# Patient Record
Sex: MALE | Race: WHITE | Employment: OTHER | URBAN - NONMETROPOLITAN AREA
[De-identification: names, ages, dates, MRNs, and addresses within clinical notes are randomized per-mention and may not be internally consistent; named-entity substitution may affect disease eponyms.]

---

## 2023-10-13 ENCOUNTER — HOSPITAL ENCOUNTER (OUTPATIENT)
Dept: GENERAL RADIOLOGY | Age: 68
Discharge: HOME | End: 2023-10-13
Payer: MEDICARE

## 2023-10-13 ENCOUNTER — HOSPITAL ENCOUNTER (OUTPATIENT)
Age: 68
Discharge: HOME | End: 2023-10-13
Payer: MEDICARE

## 2023-10-13 DIAGNOSIS — M54.2 CERVICALGIA: ICD-10-CM

## 2023-10-13 DIAGNOSIS — M54.50 LOW BACK PAIN, UNSPECIFIED BACK PAIN LATERALITY, UNSPECIFIED CHRONICITY, UNSPECIFIED WHETHER SCIATICA PRESENT: ICD-10-CM

## 2023-10-13 PROCEDURE — 72040 X-RAY EXAM NECK SPINE 2-3 VW: CPT

## 2023-10-13 PROCEDURE — 72100 X-RAY EXAM L-S SPINE 2/3 VWS: CPT

## 2023-11-28 ENCOUNTER — OFFICE VISIT (OUTPATIENT)
Dept: PHYSICAL MEDICINE AND REHAB | Age: 68
End: 2023-11-28
Payer: MEDICARE

## 2023-11-28 ENCOUNTER — TELEPHONE (OUTPATIENT)
Dept: PHYSICAL MEDICINE AND REHAB | Age: 68
End: 2023-11-28

## 2023-11-28 VITALS — WEIGHT: 241.2 LBS | DIASTOLIC BLOOD PRESSURE: 82 MMHG | SYSTOLIC BLOOD PRESSURE: 124 MMHG

## 2023-11-28 DIAGNOSIS — M51.36 LUMBAR DEGENERATIVE DISC DISEASE: ICD-10-CM

## 2023-11-28 DIAGNOSIS — M54.17 LUMBOSACRAL RADICULITIS: Primary | ICD-10-CM

## 2023-11-28 DIAGNOSIS — M47.816 LUMBAR SPONDYLOSIS: ICD-10-CM

## 2023-11-28 DIAGNOSIS — M54.41 CHRONIC MIDLINE LOW BACK PAIN WITH BILATERAL SCIATICA: ICD-10-CM

## 2023-11-28 DIAGNOSIS — M54.42 CHRONIC MIDLINE LOW BACK PAIN WITH BILATERAL SCIATICA: ICD-10-CM

## 2023-11-28 DIAGNOSIS — G89.29 CHRONIC MYOFASCIAL PAIN: ICD-10-CM

## 2023-11-28 DIAGNOSIS — G89.29 CHRONIC MIDLINE LOW BACK PAIN WITH BILATERAL SCIATICA: ICD-10-CM

## 2023-11-28 DIAGNOSIS — Z98.890 HISTORY OF LUMBAR LAMINECTOMY: ICD-10-CM

## 2023-11-28 DIAGNOSIS — M79.18 CHRONIC MYOFASCIAL PAIN: ICD-10-CM

## 2023-11-28 PROCEDURE — G8427 DOCREV CUR MEDS BY ELIG CLIN: HCPCS | Performed by: NURSE PRACTITIONER

## 2023-11-28 PROCEDURE — 3017F COLORECTAL CA SCREEN DOC REV: CPT | Performed by: NURSE PRACTITIONER

## 2023-11-28 PROCEDURE — 1123F ACP DISCUSS/DSCN MKR DOCD: CPT | Performed by: NURSE PRACTITIONER

## 2023-11-28 PROCEDURE — G8421 BMI NOT CALCULATED: HCPCS | Performed by: NURSE PRACTITIONER

## 2023-11-28 PROCEDURE — 99205 OFFICE O/P NEW HI 60 MIN: CPT | Performed by: NURSE PRACTITIONER

## 2023-11-28 PROCEDURE — 4004F PT TOBACCO SCREEN RCVD TLK: CPT | Performed by: NURSE PRACTITIONER

## 2023-11-28 PROCEDURE — G8484 FLU IMMUNIZE NO ADMIN: HCPCS | Performed by: NURSE PRACTITIONER

## 2023-11-28 RX ORDER — SOTALOL HYDROCHLORIDE 80 MG/1
TABLET ORAL
COMMUNITY
Start: 2023-09-15

## 2023-11-28 RX ORDER — ASPIRIN 81 MG/1
81 TABLET ORAL DAILY
COMMUNITY

## 2023-11-28 RX ORDER — GABAPENTIN 100 MG/1
CAPSULE ORAL
COMMUNITY
Start: 2023-11-10

## 2023-11-28 RX ORDER — HYDROCODONE BITARTRATE AND ACETAMINOPHEN 7.5; 325 MG/1; MG/1
TABLET ORAL
COMMUNITY
Start: 2023-08-29

## 2023-11-28 RX ORDER — BACLOFEN 20 MG/1
20 TABLET ORAL 3 TIMES DAILY PRN
COMMUNITY
Start: 2023-11-07

## 2023-11-28 RX ORDER — SUCRALFATE 1 G/1
TABLET ORAL
COMMUNITY
Start: 2023-11-10

## 2023-11-28 RX ORDER — MIDODRINE HYDROCHLORIDE 10 MG/1
TABLET ORAL
COMMUNITY
Start: 2023-11-10

## 2023-11-28 RX ORDER — TIMOLOL MALEATE 5 MG/ML
SOLUTION/ DROPS OPHTHALMIC
COMMUNITY
Start: 2023-11-13

## 2023-11-28 RX ORDER — MULTIVIT-MIN/FA/LYCOPEN/LUTEIN .4-300-25
1 TABLET ORAL DAILY
COMMUNITY

## 2023-11-28 RX ORDER — LATANOPROST 50 UG/ML
SOLUTION/ DROPS OPHTHALMIC
COMMUNITY
Start: 2023-10-20

## 2023-11-28 RX ORDER — PANTOPRAZOLE SODIUM 40 MG/1
TABLET, DELAYED RELEASE ORAL
COMMUNITY
Start: 2023-09-15

## 2023-11-28 ASSESSMENT — ENCOUNTER SYMPTOMS
CHOKING: 0
BACK PAIN: 1
CHEST TIGHTNESS: 0
WHEEZING: 0
SHORTNESS OF BREATH: 1
GASTROINTESTINAL NEGATIVE: 1
APNEA: 0

## 2023-11-28 NOTE — PROGRESS NOTES
[address]  [facility]  Dept: [phone]  Dept Fax: [phone]: [phone]    Visit Date: 11/28/2023    Crispin Hatch is a 70 y.o. male who is referred for pain management evaluation and treatment per Dr. Yuliana Martines. CAGE and CAGE-AID Questions   1. In the last three months, have you felt you should cut down or stop drinking or using drugs? Yes []        No [x]     2. In the last three months, has anyone annoyed you or gotten on your nerves by telling you to cut down or stop drinking or using drugs? Yes []        No [x]     3. In the last three months, have you felt guilty or bad about how much you drink or use drugs? Yes []        No [x]     4. In the last three months, have you been waking up wanting to have an alcoholic drink or use drugs? Yes []        No [x]        Opioid Risk Tool:  Clinician Form       1. Family History of Substance Abuse: Female Male    Alcohol   []1   []3    Illegal drugs   []2   []3    Prescription drugs     []4   []4   2. Personal History of Substance Abuse:          Alcohol   []3   []3    Illegal drugs   []4   []4    Prescription drugs     []5   []5   3. Age (alban box if between 12 and 39):     []1   []1   4. History of Preadolescent Sexual Abuse:     []3   []0   5. Psychological Disease:      Attention deficit disorder, obsessive-compulsive disorder, bipolar, schizophrenia   []2   []2      Depression     []1   []1    Scoring Totals       Total Score  Low Risk  Moderate Risk  High Risk   Risk Category   0 - 3   4 - 7   8 or Above      Patient states symptoms interfere with:  A.  General Activity:  yes   B. Mood: yes    C. Walking Ability:   yes   D. Normal Work (Includes both work outside the home and housework):   yes    E.  Relations with Other People:  yes   F. Sleep:   yes   G.  Enjoyment of Life:  yes
is normal.      Breath sounds: Normal breath sounds. Abdominal:      General: Abdomen is flat. Palpations: Abdomen is soft. Musculoskeletal:         General: Tenderness present. Cervical back: Normal range of motion. Rigidity present. No tenderness. Thoracic back: Tenderness present. No bony tenderness. Normal range of motion. Lumbar back: Tenderness and bony tenderness present. No swelling or edema. Decreased range of motion. Positive right straight leg raise test and positive left straight leg raise test.        Back:    Skin:         Neurological:      Mental Status: He is alert. Sensory: Sensory deficit present. Motor: Weakness present. Coordination: Coordination abnormal.      Gait: Gait abnormal.      Comments: 4/5 strength lower extremities          CRISTINO  Patricks test  positive  Yeoman's or Gaenslen's  positive     Assessment:     1. Lumbosacral radiculitis    2. Lumbar spondylosis    3. Lumbar degenerative disc disease    4. Chronic myofascial pain    5. Chronic midline low back pain with bilateral sciatica    6. History of lumbar laminectomy            Plan:      Patient read and signed orientation and opioid agreement. OARRS reviewed. Current MED: 0  Patient was not offered naloxone for home. Discussed long term side effects of medications, tolerance, dependency and addiction. UDS preformed today. Patient told can not receive any pain medications from any other source. No evidence of abuse, diversion or aberrant behavior. Medications and/or procedures to improve function and quality of life- patient understanding with this and that may not be pain free  Discussed possible weaning of medication dosing dependent on treatment/procedure results.    Discussed with patient about safe storage of medications at home  Reviewed pcp notes in detail   Reviewed lumbar and cervical xray in detail   Ordered Lumbar MRI with and without contrast  d/t radicular pain (patient

## 2023-11-28 NOTE — TELEPHONE ENCOUNTER
Pt.called to say he called [employer] about his pacemaker and they say he can have the MRI. He wants to know if will order instead of the ct scan?

## 2023-11-29 NOTE — TELEPHONE ENCOUNTER
Called pt.and Reviewed Zaid REILLY response with wife(lito). OK with going to [facility]. Called scheduling and they will call pt. To schedule as will need verification from pt. s heart  For his pacemaker that is compatable.

## 2023-11-29 NOTE — TELEPHONE ENCOUNTER
Yes, I made an addendum to my note from yesterday and canceled the CT order and ordered a lumbar MRI

## 2024-01-09 ENCOUNTER — HOSPITAL ENCOUNTER (OUTPATIENT)
Dept: MRI IMAGING | Age: 69
Discharge: HOME | End: 2024-01-09
Payer: MEDICARE

## 2024-01-09 DIAGNOSIS — M54.17 LUMBOSACRAL RADICULITIS: ICD-10-CM

## 2024-01-09 DIAGNOSIS — M51.36 LUMBAR DEGENERATIVE DISC DISEASE: ICD-10-CM

## 2024-01-09 DIAGNOSIS — M54.42 CHRONIC MIDLINE LOW BACK PAIN WITH BILATERAL SCIATICA: ICD-10-CM

## 2024-01-09 DIAGNOSIS — Z98.890 HISTORY OF LUMBAR LAMINECTOMY: ICD-10-CM

## 2024-01-09 DIAGNOSIS — G89.29 CHRONIC MIDLINE LOW BACK PAIN WITH BILATERAL SCIATICA: ICD-10-CM

## 2024-01-09 DIAGNOSIS — M54.41 CHRONIC MIDLINE LOW BACK PAIN WITH BILATERAL SCIATICA: ICD-10-CM

## 2024-01-09 DIAGNOSIS — M47.816 LUMBAR SPONDYLOSIS: ICD-10-CM

## 2024-01-09 LAB — POC CREATININE WHOLE BLOOD: 1 MG/DL (ref 0.5–1.2)

## 2024-01-09 PROCEDURE — 82565 ASSAY OF CREATININE: CPT

## 2024-01-09 PROCEDURE — 6360000004 HC RX CONTRAST MEDICATION: Performed by: NURSE PRACTITIONER

## 2024-01-09 PROCEDURE — 72158 MRI LUMBAR SPINE W/O & W/DYE: CPT

## 2024-01-09 PROCEDURE — A9579 GAD-BASE MR CONTRAST NOS,1ML: HCPCS | Performed by: NURSE PRACTITIONER

## 2024-01-09 RX ADMIN — GADOTERIDOL 20 ML: 279.3 INJECTION, SOLUTION INTRAVENOUS at 15:57

## 2024-01-16 ENCOUNTER — TELEPHONE (OUTPATIENT)
Dept: PHYSICAL MEDICINE AND REHAB | Age: 69
End: 2024-01-16

## 2024-01-16 DIAGNOSIS — G89.29 CHRONIC MYOFASCIAL PAIN: ICD-10-CM

## 2024-01-16 DIAGNOSIS — Z98.890 HISTORY OF LUMBAR LAMINECTOMY: ICD-10-CM

## 2024-01-16 DIAGNOSIS — M54.42 CHRONIC MIDLINE LOW BACK PAIN WITH BILATERAL SCIATICA: ICD-10-CM

## 2024-01-16 DIAGNOSIS — M54.17 LUMBOSACRAL RADICULITIS: Primary | ICD-10-CM

## 2024-01-16 DIAGNOSIS — M79.18 CHRONIC MYOFASCIAL PAIN: ICD-10-CM

## 2024-01-16 DIAGNOSIS — M51.36 LUMBAR DEGENERATIVE DISC DISEASE: ICD-10-CM

## 2024-01-16 DIAGNOSIS — M47.816 LUMBAR SPONDYLOSIS: ICD-10-CM

## 2024-01-16 DIAGNOSIS — G89.29 CHRONIC MIDLINE LOW BACK PAIN WITH BILATERAL SCIATICA: ICD-10-CM

## 2024-01-16 DIAGNOSIS — M54.41 CHRONIC MIDLINE LOW BACK PAIN WITH BILATERAL SCIATICA: ICD-10-CM

## 2024-01-16 NOTE — TELEPHONE ENCOUNTER
Results of Lumbar MRI:   IMPRESSION:     1. Significant bilateral foraminal stenosis throughout the lumbar spine. This is severe on the left at the L5-S1 level.  2. Spinal canal stenosis is mild to moderate at the L1-2 level.    This can be discussed further at FU and treatment such as LESI vs TFLESI.     I will sign therapy order and he should start this and follow up after

## 2024-01-16 NOTE — TELEPHONE ENCOUNTER
Notif wife(HIPAA) of results of MRI as per Zaid RICO And therapy ordered. Reminded to call closer to when therapy done for an aptm for further review of next options with Zaid.

## 2024-01-16 NOTE — TELEPHONE ENCOUNTER
Wife(HIPAA) called and requested results on MRI. She also inquired about him needing aqua therapy and told her it was ordered when you last saw on  to be done at the [facility]. He did not do as they were waiting on the MRI to get done. Will need new order since . Setup for you to resign.

## 2024-01-18 NOTE — TELEPHONE ENCOUNTER
Wife called in and LVM saying Gary hurt his right arm and pulled a muscle. He is going to hold off till next week to start therapy.

## 2024-02-16 ENCOUNTER — HOSPITAL ENCOUNTER (OUTPATIENT)
Dept: PHYSICAL THERAPY | Age: 69
Setting detail: THERAPIES SERIES
Discharge: HOME | End: 2024-02-16
Payer: MEDICARE

## 2024-02-16 PROCEDURE — 97530 THERAPEUTIC ACTIVITIES: CPT

## 2024-02-16 PROCEDURE — 97162 PT EVAL MOD COMPLEX 30 MIN: CPT
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
## 2024-02-20 ENCOUNTER — HOSPITAL ENCOUNTER (OUTPATIENT)
Dept: PHYSICAL THERAPY | Age: 69
Setting detail: THERAPIES SERIES
Discharge: HOME | End: 2024-02-20
Payer: MEDICARE

## 2024-02-20 PROCEDURE — 97113 AQUATIC THERAPY/EXERCISES: CPT

## 2024-02-20 NOTE — PROGRESS NOTES
Patient limited by fatigue  []  Patient limited by pain   []  Patient limited by medical complications  []  Other:     Assessment: Initiated aquatic therex. Tolerated without increased symptoms.         Body Structures/Functions/Activity Limitations: edema, impaired activity tolerance, impaired balance, impaired endurance, impaired muscle tone, impaired ROM, impaired strength, pain, abnormal gait, and abnormal posture  Prognosis: good    GOALS:  Patient Goal: Better manage pain; keep from losing strength and mobility    Short Term Goals:  Time Frame: 4 weeks  Caleb will be able to perform 10+ reps of all exercises in the pool while maintaining neutral spine posturing without requiring verbal cuing.  Caleb's left LE strength will improve to 4/5 with hip flexor, quads and hamstrings allowing greater confidence with ascending/descending stairs.  Caleb's Modified ALYSA will improve to <13 indicating moderate disability related to his back pain.  Caleb will transition from pool program to land based program to establish a functional exercises program.      Long Term Goals:  Time Frame: 8 weeks  Caleb will be discharged from PT with independent HEP to be performed in both the pool and land to maintain all gains achieved in clinic.  Caleb' Modified ALYSA will improve to <5 indicating minimal to no disability related to his back pain.      Patient Education:   [x]  HEP/Education Completed: Plan of Care, Goals, aquatic therapy  [facility] Access Code:  []  No new Education completed  []  Reviewed Prior HEP      [x]  Patient verbalized and/or demonstrated understanding of education provided.  []  Patient unable to verbalize and/or demonstrate understanding of education provided.  Will continue education.  [x]  Barriers to learning: n/a    PLAN:  Treatment Recommendations: Strengthening, Range of Motion, Balance Training, Gait Training, Stair Training, Neuromuscular Re-education, Manual Therapy - Soft Tissue Mobilization, Manual Therapy -

## 2024-02-23 ENCOUNTER — HOSPITAL ENCOUNTER (OUTPATIENT)
Dept: PHYSICAL THERAPY | Age: 69
Setting detail: THERAPIES SERIES
Discharge: HOME | End: 2024-02-23
Payer: MEDICARE

## 2024-02-23 PROCEDURE — 97113 AQUATIC THERAPY/EXERCISES: CPT
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
## 2024-02-27 ENCOUNTER — HOSPITAL ENCOUNTER (OUTPATIENT)
Dept: PHYSICAL THERAPY | Age: 69
Setting detail: THERAPIES SERIES
Discharge: HOME | End: 2024-02-27
Payer: MEDICARE

## 2024-02-27 PROCEDURE — 97113 AQUATIC THERAPY/EXERCISES: CPT
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
## 2024-03-01 ENCOUNTER — HOSPITAL ENCOUNTER (OUTPATIENT)
Dept: PHYSICAL THERAPY | Age: 69
Setting detail: THERAPIES SERIES
Discharge: HOME | End: 2024-03-01
Payer: MEDICARE

## 2024-03-01 PROCEDURE — 97113 AQUATIC THERAPY/EXERCISES: CPT
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
## 2024-03-05 ENCOUNTER — HOSPITAL ENCOUNTER (OUTPATIENT)
Dept: PHYSICAL THERAPY | Age: 69
Setting detail: THERAPIES SERIES
Discharge: HOME | End: 2024-03-05
Payer: MEDICARE

## 2024-03-05 PROCEDURE — 97113 AQUATIC THERAPY/EXERCISES: CPT

## 2024-03-05 NOTE — PROGRESS NOTES
He has noticed that he has lost strength and muscle girth to bilateral gastrocs. He has addressed it by working out at home, walking on uneven surfaces etc. He has had [employer] therapy in the past to address low back pain.    His primary pain clinic doctor is referring him to therapy to try aquatic therapy before he is able to fill his hydrocodone prescription. He has had chronic pain treatment in the past which involved injections, medication, nerve stimulation etc.     He has been told by his cardiologist that he is no longer allowed to take NSAIDs due possible interference with cardiac meds; he is allowed ice for pain management at this time.     He denies neuropathy, however he states that he nerves have  off. He struggles with pushing/pulling through the legs    He is currently seeing Abby at [facility] for his right shoulder.     SUBJECTIVE: Subjective reports of continued pain in the Low Back and Bilateral upper extremities, primarily shoulders. Overall Pain rating, 8/10 upon initial patient interview. Patient reports occasional NSAID usage, in moderation and in small dosage, to manage pain when necessary.      AQUATICS TREATMENT   Precautions:    Pain:7/10 generalized    “X” in shaded column indicates activity completed today   Exercise/Intervention Sets/Sec  Notes   Walk Forward X2 laps  X    Walk Backward X2 laps  X    Walk Sideways X2 laps  X           Lower Extremity Exercises:       Heel/Toe Raises x20  X Performed without holding onto the side due to increase in right thumb pain with gripping   Marches 4 lengths  X    Squats x20  X    3 Way Hip x20  X    Hamstring Curls X4 lengths   X    Lunges       Step-Ups              Lower Extremity Stretches:              Seated Exercises:              Upper Extremity Exercises:       Shoulder Flexion x20  x Performed with back up against side of pool to stabilize his back and to limit how far he moves his arms    Shoulder ABD/ADD x20  x    Shoulder Horizontal

## 2024-03-08 ENCOUNTER — HOSPITAL ENCOUNTER (OUTPATIENT)
Dept: PHYSICAL THERAPY | Age: 69
Setting detail: THERAPIES SERIES
Discharge: HOME | End: 2024-03-08
Payer: MEDICARE

## 2024-03-08 PROCEDURE — 97113 AQUATIC THERAPY/EXERCISES: CPT

## 2024-03-08 NOTE — PROGRESS NOTES
Hip ABD/ADD       Hip Flex/Ext            Specific Interventions Next Treatment: Initiate aquatic therapy program    Activity/Treatment Tolerance:  []  Patient tolerated treatment well  []  Patient limited by fatigue  []  Patient limited by pain   []  Patient limited by medical complications  []  Other:     Assessment: Caleb requires constant cuing/reminding to keep movements smaller and to control the movements so as not to aggravate his back pain. Tolerates aquatic therex well.      Body Structures/Functions/Activity Limitations: edema, impaired activity tolerance, impaired balance, impaired endurance, impaired muscle tone, impaired ROM, impaired strength, pain, abnormal gait, and abnormal posture  Prognosis: good    GOALS:  Patient Goal: Better manage pain; keep from losing strength and mobility    Short Term Goals:  Time Frame: 4 weeks  Caleb will be able to perform 10+ reps of all exercises in the pool while maintaining neutral spine posturing without requiring verbal cuing.  Caleb's left LE strength will improve to 4/5 with hip flexor, quads and hamstrings allowing greater confidence with ascending/descending stairs.  Caleb's Modified ALYSA will improve to <13 indicating moderate disability related to his back pain.  Caleb will transition from pool program to land based program to establish a functional exercises program.      Long Term Goals:  Time Frame: 8 weeks  Caleb will be discharged from PT with independent HEP to be performed in both the pool and land to maintain all gains achieved in clinic.  Caleb' Modified ALYSA will improve to <5 indicating minimal to no disability related to his back pain.      Patient Education:   []  HEP/Education Completed: Plan of Care, Goals, aquatic therapy  [employer] Access Code:  [x]  No new Education completed  []  Reviewed Prior HEP      []  Patient verbalized and/or demonstrated understanding of education provided.  []  Patient unable to verbalize and/or demonstrate understanding of

## 2024-03-12 ENCOUNTER — HOSPITAL ENCOUNTER (OUTPATIENT)
Dept: PHYSICAL THERAPY | Age: 69
Setting detail: THERAPIES SERIES
Discharge: HOME | End: 2024-03-12
Payer: MEDICARE

## 2024-03-12 PROCEDURE — 97113 AQUATIC THERAPY/EXERCISES: CPT

## 2024-03-12 NOTE — PROGRESS NOTES
Strengthening, Range of Motion, Balance Training, Gait Training, Stair Training, Neuromuscular Re-education, Manual Therapy - Soft Tissue Mobilization, Manual Therapy - Joint Manipulation, Pain Management, Home Exercise Program, Patient Education, Integrative Dry Needling, Aquatics, and Modalities    []  Plan of care initiated.  Plan to see patient 2 times per week for 8 weeks to address the treatment planned outlined above.  [x]  Continue with current plan of care  []  Modify plan of care as follows:    []  Hold pending physician visit  []  Discharge    Time In 0950   Time Out 1030   Timed Code Minutes: 40 min   Total Treatment Time: 40 min       Electronically Signed by: Johan Torres, [id number]

## 2024-03-14 ENCOUNTER — OFFICE VISIT (OUTPATIENT)
Dept: PHYSICAL MEDICINE AND REHAB | Age: 69
End: 2024-03-14
Payer: MEDICARE

## 2024-03-14 VITALS — DIASTOLIC BLOOD PRESSURE: 74 MMHG | SYSTOLIC BLOOD PRESSURE: 140 MMHG | WEIGHT: 241 LBS

## 2024-03-14 DIAGNOSIS — Z98.890 HISTORY OF LUMBAR LAMINECTOMY: ICD-10-CM

## 2024-03-14 DIAGNOSIS — M48.061 SPINAL STENOSIS OF LUMBAR REGION WITHOUT NEUROGENIC CLAUDICATION: ICD-10-CM

## 2024-03-14 DIAGNOSIS — G89.29 CHRONIC MIDLINE LOW BACK PAIN WITH BILATERAL SCIATICA: ICD-10-CM

## 2024-03-14 DIAGNOSIS — M54.41 CHRONIC MIDLINE LOW BACK PAIN WITH BILATERAL SCIATICA: ICD-10-CM

## 2024-03-14 DIAGNOSIS — G89.4 CHRONIC PAIN SYNDROME: ICD-10-CM

## 2024-03-14 DIAGNOSIS — M51.36 LUMBAR DEGENERATIVE DISC DISEASE: ICD-10-CM

## 2024-03-14 DIAGNOSIS — M54.42 CHRONIC MIDLINE LOW BACK PAIN WITH BILATERAL SCIATICA: ICD-10-CM

## 2024-03-14 DIAGNOSIS — M79.18 CHRONIC MYOFASCIAL PAIN: ICD-10-CM

## 2024-03-14 DIAGNOSIS — G89.29 CHRONIC MYOFASCIAL PAIN: ICD-10-CM

## 2024-03-14 DIAGNOSIS — M54.17 LUMBOSACRAL RADICULITIS: Primary | ICD-10-CM

## 2024-03-14 DIAGNOSIS — M47.816 LUMBAR SPONDYLOSIS: ICD-10-CM

## 2024-03-14 PROCEDURE — G8421 BMI NOT CALCULATED: HCPCS | Performed by: NURSE PRACTITIONER

## 2024-03-14 PROCEDURE — G8484 FLU IMMUNIZE NO ADMIN: HCPCS | Performed by: NURSE PRACTITIONER

## 2024-03-14 PROCEDURE — 99214 OFFICE O/P EST MOD 30 MIN: CPT | Performed by: NURSE PRACTITIONER

## 2024-03-14 PROCEDURE — 3017F COLORECTAL CA SCREEN DOC REV: CPT | Performed by: NURSE PRACTITIONER

## 2024-03-14 PROCEDURE — G8427 DOCREV CUR MEDS BY ELIG CLIN: HCPCS | Performed by: NURSE PRACTITIONER

## 2024-03-14 PROCEDURE — 1036F TOBACCO NON-USER: CPT | Performed by: NURSE PRACTITIONER

## 2024-03-14 PROCEDURE — 1123F ACP DISCUSS/DSCN MKR DOCD: CPT | Performed by: NURSE PRACTITIONER

## 2024-03-14 RX ORDER — HYDROCODONE BITARTRATE AND ACETAMINOPHEN 7.5; 325 MG/1; MG/1
1 TABLET ORAL 2 TIMES DAILY PRN
Qty: 28 TABLET | Refills: 0 | Status: SHIPPED | OUTPATIENT
Start: 2024-03-14 | End: 2024-03-28

## 2024-03-14 ASSESSMENT — ENCOUNTER SYMPTOMS
BACK PAIN: 1
APNEA: 0
CHOKING: 0
WHEEZING: 0
SHORTNESS OF BREATH: 1
GASTROINTESTINAL NEGATIVE: 1
CHEST TIGHTNESS: 0

## 2024-03-14 NOTE — PROGRESS NOTES
Functionality Assessment/Goals Worksheet     On a scale of 0 (Does not Interfere) to 10 (Completely Interferes)     1.  Which number describes how during the past week pain has interfered with           the following:  A.  General Activity:  8  B.  Mood: 7  C.  Walking Ability:  5  D.  Normal Work (Includes both work outside the home and housework):  8  E.  Relations with Other People:   5  F.  Sleep:   2  G.  Enjoyment of Life:   8    2.  Patient Prefers to Take their Pain Medications:     []  On a regular basis   [x]  Only when necessary    []  Does not take pain medications    3.  What are the Patient's Goals/Expectations for Visiting Pain Management?     []  Learn about my pain    [x]  Receive Medication   []  Physical Therapy     []  Treat Depression   []  Receive Injections    []  Treat Sleep   []  Deal with Anxiety and Stress   []  Treat Opoid Dependence/Addiction   []  Other:         HPI:   Gary Gomez is a 68 y.o. male is here today for    Chief Complaint: Low back pain, extremity pain     Patients wife present     HPI   3.5 month FU to review lumbar MRI and review therapies. Patient started therapy in mid February because the pool was closed and he continues to participate with this now and has been a full month, also doing occupational therapy for right shoulder as he states in January he was tryingh to gather and spit wood and he had a rotator cuff injury. States that therapy is helping mobility but both therapies together are aggravating pain.     Having pain in right shoulder shooting pain- and is following with ortho. Dr. Gracia  Has complaints of generlized pain all over and states it changes areas. Today has been bothersome in bilateral upper arms just a aching and fatigue pain.     Continues to have pain in low back starts in center and radiates across- aching pain pain radiates down bilateral legs laterally down into calves and feet- shooting, burning and numbness and stabbing and pins and needles

## 2024-03-15 ENCOUNTER — APPOINTMENT (OUTPATIENT)
Dept: PHYSICAL THERAPY | Age: 69
End: 2024-03-15
Payer: MEDICARE

## 2024-03-18 DIAGNOSIS — M54.42 CHRONIC MIDLINE LOW BACK PAIN WITH BILATERAL SCIATICA: ICD-10-CM

## 2024-03-18 DIAGNOSIS — G89.29 CHRONIC MIDLINE LOW BACK PAIN WITH BILATERAL SCIATICA: ICD-10-CM

## 2024-03-18 DIAGNOSIS — G89.4 CHRONIC PAIN SYNDROME: ICD-10-CM

## 2024-03-18 DIAGNOSIS — M54.41 CHRONIC MIDLINE LOW BACK PAIN WITH BILATERAL SCIATICA: ICD-10-CM

## 2024-03-18 DIAGNOSIS — G89.29 CHRONIC MYOFASCIAL PAIN: ICD-10-CM

## 2024-03-18 DIAGNOSIS — Z98.890 HISTORY OF LUMBAR LAMINECTOMY: ICD-10-CM

## 2024-03-18 DIAGNOSIS — M47.816 LUMBAR SPONDYLOSIS: ICD-10-CM

## 2024-03-18 DIAGNOSIS — M48.061 SPINAL STENOSIS OF LUMBAR REGION WITHOUT NEUROGENIC CLAUDICATION: ICD-10-CM

## 2024-03-18 DIAGNOSIS — M54.17 LUMBOSACRAL RADICULITIS: ICD-10-CM

## 2024-03-18 DIAGNOSIS — M79.18 CHRONIC MYOFASCIAL PAIN: ICD-10-CM

## 2024-03-18 DIAGNOSIS — M51.36 LUMBAR DEGENERATIVE DISC DISEASE: ICD-10-CM

## 2024-03-18 RX ORDER — HYDROCODONE BITARTRATE AND ACETAMINOPHEN 7.5; 325 MG/1; MG/1
1 TABLET ORAL 2 TIMES DAILY PRN
Qty: 60 TABLET | Refills: 0 | Status: SHIPPED | OUTPATIENT
Start: 2024-03-21 | End: 2024-04-20

## 2024-03-18 NOTE — TELEPHONE ENCOUNTER
OARRS reviewed. UDS: + for  baclofen,gabapentin.   Last seen: 3/14/2024. Follow-up:   Future Appointments   Date Time Provider Department Center   3/19/2024 10:45 AM Hannah Díaz, PT [facility] [place] [facility]   4/18/2024  9:00 AM Conor Jarrett, APRN - CNP [facility] [place]   5/23/2024 12:00 PM Lucio Silvestre MD [facility] [place]    When saw on 3/14 gave 2 week script and insurance would only allow a 1 week will need new fill. Called pharmacy and verified as did not show up in OARRS

## 2024-03-19 ENCOUNTER — HOSPITAL ENCOUNTER (OUTPATIENT)
Dept: PHYSICAL THERAPY | Age: 69
Setting detail: THERAPIES SERIES
Discharge: HOME | End: 2024-03-19
Payer: MEDICARE

## 2024-03-19 PROCEDURE — 97113 AQUATIC THERAPY/EXERCISES: CPT
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
## 2024-04-18 ENCOUNTER — OFFICE VISIT (OUTPATIENT)
Dept: PHYSICAL MEDICINE AND REHAB | Age: 69
End: 2024-04-18
Payer: MEDICARE

## 2024-04-18 VITALS
HEART RATE: 80 BPM | HEIGHT: 73 IN | DIASTOLIC BLOOD PRESSURE: 72 MMHG | WEIGHT: 240 LBS | SYSTOLIC BLOOD PRESSURE: 128 MMHG | BODY MASS INDEX: 31.81 KG/M2

## 2024-04-18 DIAGNOSIS — M47.816 LUMBAR SPONDYLOSIS: ICD-10-CM

## 2024-04-18 DIAGNOSIS — G89.4 CHRONIC PAIN SYNDROME: ICD-10-CM

## 2024-04-18 DIAGNOSIS — M54.41 CHRONIC MIDLINE LOW BACK PAIN WITH BILATERAL SCIATICA: ICD-10-CM

## 2024-04-18 DIAGNOSIS — G89.29 CHRONIC MIDLINE LOW BACK PAIN WITH BILATERAL SCIATICA: ICD-10-CM

## 2024-04-18 DIAGNOSIS — M54.42 CHRONIC MIDLINE LOW BACK PAIN WITH BILATERAL SCIATICA: ICD-10-CM

## 2024-04-18 DIAGNOSIS — M54.2 NECK PAIN: ICD-10-CM

## 2024-04-18 DIAGNOSIS — M54.17 LUMBOSACRAL RADICULITIS: Primary | ICD-10-CM

## 2024-04-18 DIAGNOSIS — M79.18 CHRONIC MYOFASCIAL PAIN: ICD-10-CM

## 2024-04-18 DIAGNOSIS — Z98.890 HISTORY OF LUMBAR LAMINECTOMY: ICD-10-CM

## 2024-04-18 DIAGNOSIS — G89.29 CHRONIC MYOFASCIAL PAIN: ICD-10-CM

## 2024-04-18 DIAGNOSIS — M48.061 SPINAL STENOSIS OF LUMBAR REGION WITHOUT NEUROGENIC CLAUDICATION: ICD-10-CM

## 2024-04-18 DIAGNOSIS — M51.36 LUMBAR DEGENERATIVE DISC DISEASE: ICD-10-CM

## 2024-04-18 DIAGNOSIS — M47.812 CERVICAL SPONDYLOSIS: ICD-10-CM

## 2024-04-18 PROCEDURE — 1036F TOBACCO NON-USER: CPT | Performed by: NURSE PRACTITIONER

## 2024-04-18 PROCEDURE — G8427 DOCREV CUR MEDS BY ELIG CLIN: HCPCS | Performed by: NURSE PRACTITIONER

## 2024-04-18 PROCEDURE — 1123F ACP DISCUSS/DSCN MKR DOCD: CPT | Performed by: NURSE PRACTITIONER

## 2024-04-18 PROCEDURE — G8417 CALC BMI ABV UP PARAM F/U: HCPCS | Performed by: NURSE PRACTITIONER

## 2024-04-18 PROCEDURE — 3017F COLORECTAL CA SCREEN DOC REV: CPT | Performed by: NURSE PRACTITIONER

## 2024-04-18 PROCEDURE — 99214 OFFICE O/P EST MOD 30 MIN: CPT | Performed by: NURSE PRACTITIONER

## 2024-04-18 ASSESSMENT — ENCOUNTER SYMPTOMS
BACK PAIN: 1
WHEEZING: 0
APNEA: 0
GASTROINTESTINAL NEGATIVE: 1
SHORTNESS OF BREATH: 1
CHOKING: 0
CHEST TIGHTNESS: 0

## 2024-04-18 NOTE — PROGRESS NOTES
Functionality Assessment/Goals Worksheet     On a scale of 0 (Does not Interfere) to 10 (Completely Interferes)     1.  Which number describes how during the past week pain has interfered with           the following:  A.  General Activity:  7  B.  Mood: 4  C.  Walking Ability:  5  D.  Normal Work (Includes both work outside the home and housework):  6  E.  Relations with Other People:   3  F.  Sleep:   7  G.  Enjoyment of Life:   4    2.  Patient Prefers to Take their Pain Medications:     []  On a regular basis   [x]  Only when necessary    []  Does not take pain medications    3.  What are the Patient's Goals/Expectations for Visiting Pain Management?     []  Learn about my pain    [x]  Receive Medication   []  Physical Therapy     []  Treat Depression   [x]  Receive Injections    []  Treat Sleep   []  Deal with Anxiety and Stress   []  Treat Opoid Dependence/Addiction   []  Other:       HPI:   Gary Gomez is a 68 y.o. male is here today for    Chief Complaint: Low back pain, , neck pain extremity pain, generalized pain     HPI   1 month FU to review medications changes. Continues to have pain in low back starts in center and radiates across- aching and dull pain radiating down bilateral legs laterally down into calves and feet- shooting, burning and numbness and tingling. States this has been intermittent   Continues  aqua therapy on his own now and feels that this is really helping. Does treadmill exercises after on his own and he states he is working on building endurance.     Continues OT for for right shoulder pain and rotator cuff injury- continues to follow with ortho at [facility] and tying to prevent surgery.   Has complaints of generlized pain all over states hands and upper ams soreness, aching, charley horse pain  Has upper back and neck aching and stiffness.     Continues Neurontin 100 mg TID from pcp, taking tylenol prn, Baclofen from pcp, States Norco prn is helping decrease pain and he does not take

## 2024-05-22 PROBLEM — M48.061 LUMBAR SPINAL STENOSIS: Status: ACTIVE | Noted: 2024-05-22

## 2024-05-22 PROBLEM — Z95.0 PACEMAKER: Status: ACTIVE | Noted: 2018-01-01

## 2024-05-22 PROBLEM — M50.20 CERVICAL DISC HERNIATION: Status: ACTIVE | Noted: 2024-05-22

## 2024-05-22 PROBLEM — M51.26 LUMBAR HERNIATED DISC: Status: ACTIVE | Noted: 2024-05-22

## 2024-05-22 SDOH — HEALTH STABILITY: PHYSICAL HEALTH: ON AVERAGE, HOW MANY MINUTES DO YOU ENGAGE IN EXERCISE AT THIS LEVEL?: 30 MIN

## 2024-05-22 SDOH — HEALTH STABILITY: PHYSICAL HEALTH: ON AVERAGE, HOW MANY DAYS PER WEEK DO YOU ENGAGE IN MODERATE TO STRENUOUS EXERCISE (LIKE A BRISK WALK)?: 5 DAYS

## 2024-05-22 NOTE — PROGRESS NOTES
ago. He has a 31.5 pack-year smoking history. He has never used smokeless tobacco. He reports that he does not drink alcohol and does not use drugs.    Medications/Allergies/Immunizations:     His current medication(s) include   Current Outpatient Medications:     Magnesium Oxide 400 MG CAPS, Take 1 capsule by mouth 2 times daily, Disp: , Rfl:     ranolazine (RANEXA) 1000 MG extended release tablet, Take 1 tablet by mouth 2 times daily, Disp: , Rfl:     cyanocobalamin 1000 MCG tablet, Take 1 tablet by mouth daily, Disp: , Rfl:     GLUCOSAMINE HCL PO, Take 600 mg by mouth 3 times daily, Disp: , Rfl:     baclofen (LIORESAL) 20 MG tablet, Take 1 tablet by mouth 3 times daily as needed, Disp: , Rfl:     gabapentin (NEURONTIN) 100 MG capsule, 1 capsule 3 times daily., Disp: , Rfl:     latanoprost (XALATAN) 0.005 % ophthalmic solution, , Disp: , Rfl:     midodrine (PROAMATINE) 10 MG tablet, 2 times daily, Disp: , Rfl:     pantoprazole (PROTONIX) 40 MG tablet, , Disp: , Rfl:     sotalol (BETAPACE) 80 MG tablet, Take 0.5 tablets by mouth 2 times daily, Disp: , Rfl:     sucralfate (CARAFATE) 1 GM tablet, Take 1 tablet by mouth in the morning and 1 tablet in the evening., Disp: , Rfl:     timolol (TIMOPTIC) 0.5 % ophthalmic solution, , Disp: , Rfl:     aspirin 81 MG EC tablet, Take 1 tablet by mouth daily, Disp: , Rfl:     HYDROcodone-acetaminophen (NORCO) 7.5-325 MG per tablet, as needed., Disp: , Rfl:     Multiple Vitamins-Minerals (CENTRUM SILVER ADULT 50+) TABS, Take 1 tablet by mouth daily, Disp: , Rfl:     Pyridoxine HCl (VITAMIN B-6) 100 MG tablet, Take 1 tablet by mouth daily, Disp: , Rfl:     atorvastatin (LIPITOR) 40 MG tablet, Take 1 tablet by mouth daily, Disp: , Rfl:   Allergies: Patient has no known allergies.  Immunizations:   Immunization History   Administered Date(s) Administered    COVID-19, PFIZER PURPLE top, DILUTE for use, (age 12 y+), 30mcg/0.3mL 12/21/2021        History of Present Illness:

## 2024-05-23 ENCOUNTER — OFFICE VISIT (OUTPATIENT)
Dept: FAMILY MEDICINE CLINIC | Age: 69
End: 2024-05-23
Payer: MEDICARE

## 2024-05-23 VITALS
DIASTOLIC BLOOD PRESSURE: 80 MMHG | SYSTOLIC BLOOD PRESSURE: 118 MMHG | HEIGHT: 71 IN | TEMPERATURE: 98.3 F | BODY MASS INDEX: 32.9 KG/M2 | WEIGHT: 235 LBS | OXYGEN SATURATION: 98 % | HEART RATE: 60 BPM | RESPIRATION RATE: 10 BRPM

## 2024-05-23 DIAGNOSIS — G47.9 SLEEP DIFFICULTIES: ICD-10-CM

## 2024-05-23 DIAGNOSIS — K21.00 GASTROESOPHAGEAL REFLUX DISEASE WITH ESOPHAGITIS WITHOUT HEMORRHAGE: ICD-10-CM

## 2024-05-23 DIAGNOSIS — M50.20 CERVICAL DISC HERNIATION: ICD-10-CM

## 2024-05-23 DIAGNOSIS — E78.89 LIPIDS ABNORMAL: ICD-10-CM

## 2024-05-23 DIAGNOSIS — R68.83 CHILLS: ICD-10-CM

## 2024-05-23 DIAGNOSIS — Z95.0 PACEMAKER: ICD-10-CM

## 2024-05-23 DIAGNOSIS — R41.3 MEMORY DIFFICULTIES: ICD-10-CM

## 2024-05-23 DIAGNOSIS — R73.09 LOW GLUCOSE LEVEL: ICD-10-CM

## 2024-05-23 DIAGNOSIS — M51.26 LUMBAR HERNIATED DISC: ICD-10-CM

## 2024-05-23 DIAGNOSIS — K90.89 OTHER SPECIFIED INTESTINAL MALABSORPTION: ICD-10-CM

## 2024-05-23 DIAGNOSIS — R20.0 NUMBNESS: ICD-10-CM

## 2024-05-23 DIAGNOSIS — I48.0 PAROXYSMAL ATRIAL FIBRILLATION (HCC): ICD-10-CM

## 2024-05-23 DIAGNOSIS — R35.1 NOCTURIA: ICD-10-CM

## 2024-05-23 DIAGNOSIS — M48.061 SPINAL STENOSIS OF LUMBAR REGION WITHOUT NEUROGENIC CLAUDICATION: ICD-10-CM

## 2024-05-23 PROCEDURE — 1123F ACP DISCUSS/DSCN MKR DOCD: CPT | Performed by: FAMILY MEDICINE

## 2024-05-23 PROCEDURE — G8417 CALC BMI ABV UP PARAM F/U: HCPCS | Performed by: FAMILY MEDICINE

## 2024-05-23 PROCEDURE — 3017F COLORECTAL CA SCREEN DOC REV: CPT | Performed by: FAMILY MEDICINE

## 2024-05-23 PROCEDURE — 99205 OFFICE O/P NEW HI 60 MIN: CPT | Performed by: FAMILY MEDICINE

## 2024-05-23 PROCEDURE — G8427 DOCREV CUR MEDS BY ELIG CLIN: HCPCS | Performed by: FAMILY MEDICINE

## 2024-05-23 PROCEDURE — 1036F TOBACCO NON-USER: CPT | Performed by: FAMILY MEDICINE

## 2024-05-23 RX ORDER — CALCIUM CARBONATE/VITAMIN D3 500-10/5ML
1 LIQUID (ML) ORAL 2 TIMES DAILY
COMMUNITY
Start: 2023-01-01

## 2024-05-23 RX ORDER — RANOLAZINE 1000 MG/1
1000 TABLET, EXTENDED RELEASE ORAL 2 TIMES DAILY
COMMUNITY
Start: 2024-04-27

## 2024-05-23 SDOH — ECONOMIC STABILITY: FOOD INSECURITY: WITHIN THE PAST 12 MONTHS, THE FOOD YOU BOUGHT JUST DIDN'T LAST AND YOU DIDN'T HAVE MONEY TO GET MORE.: NEVER TRUE

## 2024-05-23 SDOH — ECONOMIC STABILITY: INCOME INSECURITY: HOW HARD IS IT FOR YOU TO PAY FOR THE VERY BASICS LIKE FOOD, HOUSING, MEDICAL CARE, AND HEATING?: NOT VERY HARD

## 2024-05-23 SDOH — ECONOMIC STABILITY: FOOD INSECURITY: WITHIN THE PAST 12 MONTHS, YOU WORRIED THAT YOUR FOOD WOULD RUN OUT BEFORE YOU GOT MONEY TO BUY MORE.: NEVER TRUE

## 2024-05-23 SDOH — ECONOMIC STABILITY: HOUSING INSECURITY
IN THE LAST 12 MONTHS, WAS THERE A TIME WHEN YOU DID NOT HAVE A STEADY PLACE TO SLEEP OR SLEPT IN A SHELTER (INCLUDING NOW)?: NO

## 2024-05-23 ASSESSMENT — PATIENT HEALTH QUESTIONNAIRE - PHQ9
SUM OF ALL RESPONSES TO PHQ QUESTIONS 1-9: 0
1. LITTLE INTEREST OR PLEASURE IN DOING THINGS: NOT AT ALL
SUM OF ALL RESPONSES TO PHQ QUESTIONS 1-9: 0
SUM OF ALL RESPONSES TO PHQ QUESTIONS 1-9: 0
SUM OF ALL RESPONSES TO PHQ9 QUESTIONS 1 & 2: 0
SUM OF ALL RESPONSES TO PHQ QUESTIONS 1-9: 0
2. FEELING DOWN, DEPRESSED OR HOPELESS: NOT AT ALL

## 2024-05-23 ASSESSMENT — ENCOUNTER SYMPTOMS
CONSTIPATION: 1
RESPIRATORY NEGATIVE: 1
BACK PAIN: 1

## 2024-05-29 ENCOUNTER — TELEPHONE (OUTPATIENT)
Dept: FAMILY MEDICINE CLINIC | Age: 69
End: 2024-05-29

## 2024-05-30 LAB
ALBUMIN: 4.5 G/DL (ref 3.5–5)
ALP BLD-CCNC: 105 IU/L (ref 41–137)
ALT SERPL-CCNC: 25 IU/L (ref 10–40)
ANION GAP SERPL CALCULATED.3IONS-SCNC: 8 MMOL/L (ref 4–12)
AST SERPL-CCNC: 23 IU/L (ref 15–41)
BASOPHILS ABSOLUTE: 0 /CMM (ref 0–200)
BASOPHILS RELATIVE PERCENT: 0.5 % (ref 0–2)
BILIRUB SERPL-MCNC: 0.8 MG/DL (ref 0.2–1)
BILIRUBIN DIRECT: 0.2 MG/DL (ref 0.1–0.2)
BUN BLDV-MCNC: 14 MG/DL (ref 7–20)
CALCIUM SERPL-MCNC: 9.3 MG/DL (ref 8.8–10.5)
CHLORIDE BLD-SCNC: 102 MEQ/L (ref 101–111)
CHOLESTEROL, TOTAL: 141 MG/DL
CHOLESTEROL/HDL RELATIVE RISK: 2.1 (ref 4–5)
CO2: 27 MEQ/L (ref 21–32)
CREAT SERPL-MCNC: 0.84 MG/DL (ref 0.6–1.3)
CREATININE CLEARANCE: >60
DIRECT-LDL / HDL RISK: 0.8
EOSINOPHILS ABSOLUTE: 100 /CMM (ref 0–500)
EOSINOPHILS RELATIVE PERCENT: 1.3 % (ref 0–6)
GLUCOSE: 110 MG/DL (ref 70–110)
HCT VFR BLD CALC: 44.5 % (ref 40–49)
HDLC SERPL-MCNC: 67 MG/DL
HEMOGLOBIN: 15.3 GM/DL (ref 13.5–16.5)
IRON % SATURATION: 28 % (ref 20–50)
IRON: 89 MCG/DL (ref 45–182)
LDL CHOLESTEROL DIRECT: 54 MG/DL
LIPASE: 22 IU/L (ref 21–51)
LYMPHOCYTES ABSOLUTE: 1300 /CMM (ref 1000–4800)
LYMPHOCYTES RELATIVE PERCENT: 28.4 % (ref 15–45)
MAGNESIUM: 2.3 MG/DL (ref 1.8–2.5)
MCH RBC QN AUTO: 33.6 PG (ref 27.5–33)
MCHC RBC AUTO-ENTMCNC: 34.5 GM/DL (ref 33–36)
MCV RBC AUTO: 97.6 CU MIC (ref 80–97)
MONOCYTES ABSOLUTE: 500 /CMM (ref 0–800)
MONOCYTES RELATIVE PERCENT: 10.4 % (ref 2–10)
NEUTROPHILS ABSOLUTE: 2700 /CMM (ref 1800–7700)
NEUTROPHILS RELATIVE PERCENT: 59.4 % (ref 40–70)
NUCLEATED RBCS: 0.2 /100 WBC
PARATHYROID HORMONE INTACT: 56.2 U/ML (ref 12–88)
PDW BLD-RTO: 12.5 % (ref 12–16)
PLATELET # BLD: 238 TH/CMM (ref 150–400)
POTASSIUM SERPL-SCNC: 4.3 MEQ/L (ref 3.6–5)
PROSTATE SPECIFIC ANTIGEN: 0.44 NG/ML
RBC # BLD: 4.56 MIL/CMM (ref 4.5–6)
SED RATE, AUTOMATED: 6 MM/HR
SODIUM BLD-SCNC: 137 MEQ/L (ref 135–145)
T3 TOTAL: 88.62 NG/DL (ref 87–178)
T4 TOTAL: 7.8 UG/DL (ref 4.5–12)
TOTAL PROTEIN: 7.1 G/DL (ref 6.2–8)
TRANSFERRIN: 220 MG/DL (ref 180–329)
TRIGL SERPL-MCNC: 56 MG/DL
TSH SERPL DL<=0.05 MIU/L-ACNC: 1.38 MCIU/ML (ref 0.49–4.67)
VITAMIN D 25-HYDROXY: 59.1 NG/ML (ref 30–100)
VLDLC SERPL CALC-MCNC: 11 MG/DL
WBC # BLD: 4.5 TH/CMM (ref 4.4–10.5)

## 2024-05-31 LAB
ESTIMATED AVERAGE GLUCOSE: 105 MG/DL
HBA1C MFR BLD: 5.3 % (ref 4.4–6.4)
RHEUMATOID FACTOR: NEGATIVE

## 2024-06-03 DIAGNOSIS — M54.17 LUMBOSACRAL RADICULITIS: ICD-10-CM

## 2024-06-03 DIAGNOSIS — Z98.890 HISTORY OF LUMBAR LAMINECTOMY: ICD-10-CM

## 2024-06-03 DIAGNOSIS — M54.41 CHRONIC MIDLINE LOW BACK PAIN WITH BILATERAL SCIATICA: ICD-10-CM

## 2024-06-03 DIAGNOSIS — G89.4 CHRONIC PAIN SYNDROME: ICD-10-CM

## 2024-06-03 DIAGNOSIS — M48.061 SPINAL STENOSIS OF LUMBAR REGION WITHOUT NEUROGENIC CLAUDICATION: ICD-10-CM

## 2024-06-03 DIAGNOSIS — M51.36 LUMBAR DEGENERATIVE DISC DISEASE: ICD-10-CM

## 2024-06-03 DIAGNOSIS — M47.816 LUMBAR SPONDYLOSIS: ICD-10-CM

## 2024-06-03 DIAGNOSIS — M79.18 CHRONIC MYOFASCIAL PAIN: ICD-10-CM

## 2024-06-03 DIAGNOSIS — M54.42 CHRONIC MIDLINE LOW BACK PAIN WITH BILATERAL SCIATICA: ICD-10-CM

## 2024-06-03 DIAGNOSIS — G89.29 CHRONIC MIDLINE LOW BACK PAIN WITH BILATERAL SCIATICA: ICD-10-CM

## 2024-06-03 DIAGNOSIS — G89.29 CHRONIC MYOFASCIAL PAIN: ICD-10-CM

## 2024-06-03 LAB
DOUBLE STRANDED DNA AB, IGG: 1.7 IU/ML
ENA ANTIBODIES SCREEN: 0.1 RATIO

## 2024-06-03 RX ORDER — HYDROCODONE BITARTRATE AND ACETAMINOPHEN 7.5; 325 MG/1; MG/1
TABLET ORAL AS NEEDED
Status: CANCELLED | OUTPATIENT
Start: 2024-06-03

## 2024-06-03 NOTE — TELEPHONE ENCOUNTER
Gary Gomez called requesting a refill on the following medications:  Requested Prescriptions     Pending Prescriptions Disp Refills    HYDROcodone-acetaminophen (NORCO) 7.5-325 MG per tablet       Sig: as needed.     Pharmacy verified:  [email or web address]    [address] [phone] - F [phone]     Date of last visit: 04/18/2024  Date of next visit (if applicable): 6/20/2024

## 2024-06-04 RX ORDER — HYDROCODONE BITARTRATE AND ACETAMINOPHEN 7.5; 325 MG/1; MG/1
1 TABLET ORAL 2 TIMES DAILY PRN
Qty: 60 TABLET | Refills: 0 | Status: SHIPPED | OUTPATIENT
Start: 2024-06-04 | End: 2024-07-04

## 2024-06-04 NOTE — TELEPHONE ENCOUNTER
OARRS reviewed. UDS: + for Gabapentin, Baclofen, Hydrocodone.   Last seen: 4/18/2024. Follow-up: 6/20/2024

## 2024-06-08 LAB
C-REACTIVE PROTEIN: 0.5 MG/L
DEHYDROEPIANDROSTERONE: 1 NG/ML
DEHYDROEPIANDROSTERONE: 60 MCG/DL (ref 12–227)
HOMOCYSTEINE, TOTAL: 6.5 NMOL/ML (ref 7.2–16.3)
TESTOSTERONE FREE: 9.9 NG/DL (ref 3.47–13)
TESTOSTERONE TOTAL: 518 NG/DL (ref 240–950)
TISSUE TRANSGLUTAMINASE ANTIBODY IGG: 1.5 U/ML
TISSUE TRANSGLUTAMINASE IGA: <1.2 U/ML

## 2024-06-18 ENCOUNTER — OFFICE VISIT (OUTPATIENT)
Dept: FAMILY MEDICINE CLINIC | Age: 69
End: 2024-06-18
Payer: MEDICARE

## 2024-06-18 VITALS
SYSTOLIC BLOOD PRESSURE: 138 MMHG | HEIGHT: 71 IN | DIASTOLIC BLOOD PRESSURE: 84 MMHG | BODY MASS INDEX: 32.84 KG/M2 | TEMPERATURE: 97 F | OXYGEN SATURATION: 97 % | HEART RATE: 63 BPM | RESPIRATION RATE: 12 BRPM | WEIGHT: 234.6 LBS

## 2024-06-18 DIAGNOSIS — I48.0 PAROXYSMAL ATRIAL FIBRILLATION (HCC): Primary | ICD-10-CM

## 2024-06-18 DIAGNOSIS — M50.20 CERVICAL DISC HERNIATION: ICD-10-CM

## 2024-06-18 DIAGNOSIS — R68.83 CHILLS: ICD-10-CM

## 2024-06-18 DIAGNOSIS — R35.1 NOCTURIA: ICD-10-CM

## 2024-06-18 DIAGNOSIS — R73.09 LOW GLUCOSE LEVEL: ICD-10-CM

## 2024-06-18 DIAGNOSIS — E78.89 LIPIDS ABNORMAL: ICD-10-CM

## 2024-06-18 DIAGNOSIS — M48.061 SPINAL STENOSIS OF LUMBAR REGION WITHOUT NEUROGENIC CLAUDICATION: ICD-10-CM

## 2024-06-18 DIAGNOSIS — Z95.0 PACEMAKER: ICD-10-CM

## 2024-06-18 DIAGNOSIS — G47.9 SLEEP DIFFICULTIES: ICD-10-CM

## 2024-06-18 DIAGNOSIS — K21.00 GASTROESOPHAGEAL REFLUX DISEASE WITH ESOPHAGITIS WITHOUT HEMORRHAGE: ICD-10-CM

## 2024-06-18 DIAGNOSIS — K90.89 OTHER SPECIFIED INTESTINAL MALABSORPTION: ICD-10-CM

## 2024-06-18 DIAGNOSIS — R41.3 MEMORY DIFFICULTIES: ICD-10-CM

## 2024-06-18 DIAGNOSIS — M51.26 LUMBAR HERNIATED DISC: ICD-10-CM

## 2024-06-18 DIAGNOSIS — R20.0 NUMBNESS: ICD-10-CM

## 2024-06-18 PROCEDURE — 3017F COLORECTAL CA SCREEN DOC REV: CPT | Performed by: FAMILY MEDICINE

## 2024-06-18 PROCEDURE — G8427 DOCREV CUR MEDS BY ELIG CLIN: HCPCS | Performed by: FAMILY MEDICINE

## 2024-06-18 PROCEDURE — 1123F ACP DISCUSS/DSCN MKR DOCD: CPT | Performed by: FAMILY MEDICINE

## 2024-06-18 PROCEDURE — G8417 CALC BMI ABV UP PARAM F/U: HCPCS | Performed by: FAMILY MEDICINE

## 2024-06-18 PROCEDURE — 99215 OFFICE O/P EST HI 40 MIN: CPT | Performed by: FAMILY MEDICINE

## 2024-06-18 PROCEDURE — 1036F TOBACCO NON-USER: CPT | Performed by: FAMILY MEDICINE

## 2024-06-18 RX ORDER — FLUTICASONE PROPIONATE 50 MCG
SPRAY, SUSPENSION (ML) NASAL
COMMUNITY

## 2024-06-18 RX ORDER — METOCLOPRAMIDE 5 MG/1
TABLET ORAL
COMMUNITY

## 2024-06-18 RX ORDER — CHOLECALCIFEROL (VITAMIN D3) 25 MCG
1 CAPSULE ORAL DAILY
COMMUNITY

## 2024-06-18 RX ORDER — PHENOL 1.4 %
1 AEROSOL, SPRAY (ML) MUCOUS MEMBRANE DAILY
COMMUNITY

## 2024-06-18 RX ORDER — CHLORPHENIRAMINE MALEATE 12 MG/1
TABLET, FILM COATED, EXTENDED RELEASE ORAL
COMMUNITY

## 2024-06-18 RX ORDER — DIPHENHYDRAMINE HCL 25 MG
1 TABLET ORAL NIGHTLY PRN
COMMUNITY

## 2024-06-18 RX ORDER — ALBUTEROL SULFATE 90 UG/1
AEROSOL, METERED RESPIRATORY (INHALATION)
COMMUNITY
Start: 2015-08-19

## 2024-06-18 NOTE — PROGRESS NOTES
fruit allergies should warn physicians before undergoing procedures which may cause anaphylactic reaction if in contact with latex gloves.  Some of the common foods with defined cross-reactivity to latex are avocado, banana, kiwi, chestnut, raw potato, tomato, stone fruits (e.g., peach, cherry), hazelnut, melons, celery, carrot, apple, pear, papaya, and almond.  Foods with less well-defined cross-reactivity to latex are peanuts, peppers, citrus fruits, coconut, pineapple, reva, fig, passion fruit, Ugli fruit, and grape.    This fruit/latex cross-reactivity is worsened by ethylene, a gas used to hasten commercial ripening. In nature, plants produce low levels of the hormone ethylene, which regulates germination, flowering, and ripening. Forced ripening by high ethylene concentrations, plants produce allergenic wound-repair proteins, which are similar to wound-repair proteins made during the tapping of rubber trees. Sensitive individuals who ingest the fruit get a higher dose and worse reaction. Some people may even first become sensitized to latex through fruit.  Can food processing increase the concentrations of allergenic proteins? Latex-sensitized children (and adults) in [place] often experience allergic reactions after eating bananas ripened artificially with ethylene. In the [place], food distribution centers treat unripe bananas and other produce with ethylene to ripen; not commonly done in [place] since fruit is tree-ripened there. Does treatment of food with ethylene induce banana proteins that cross-react with latex? (Deepthi et al.)    References:   Latex in Foods Allergy, http://ehp.niehs.nih.gov/members/2003/5811/5811.html    Search web for \" What’s in Season \" for where you live or are at the time you food shop   Management of Latex, ://medicalcenter.osu.edu/  search for nih, latex-like proteins in foods

## 2024-06-20 ENCOUNTER — OFFICE VISIT (OUTPATIENT)
Dept: PHYSICAL MEDICINE AND REHAB | Age: 69
End: 2024-06-20
Payer: MEDICARE

## 2024-06-20 VITALS
DIASTOLIC BLOOD PRESSURE: 64 MMHG | BODY MASS INDEX: 32.76 KG/M2 | WEIGHT: 234 LBS | SYSTOLIC BLOOD PRESSURE: 120 MMHG | HEIGHT: 71 IN

## 2024-06-20 DIAGNOSIS — Z98.890 HISTORY OF LUMBAR LAMINECTOMY: ICD-10-CM

## 2024-06-20 DIAGNOSIS — M48.061 SPINAL STENOSIS OF LUMBAR REGION WITHOUT NEUROGENIC CLAUDICATION: ICD-10-CM

## 2024-06-20 DIAGNOSIS — G89.29 CHRONIC MYOFASCIAL PAIN: ICD-10-CM

## 2024-06-20 DIAGNOSIS — M79.18 CHRONIC MYOFASCIAL PAIN: ICD-10-CM

## 2024-06-20 DIAGNOSIS — M54.2 NECK PAIN: ICD-10-CM

## 2024-06-20 DIAGNOSIS — M54.42 CHRONIC MIDLINE LOW BACK PAIN WITH BILATERAL SCIATICA: ICD-10-CM

## 2024-06-20 DIAGNOSIS — G89.4 CHRONIC PAIN SYNDROME: ICD-10-CM

## 2024-06-20 DIAGNOSIS — G89.29 CHRONIC MIDLINE LOW BACK PAIN WITH BILATERAL SCIATICA: ICD-10-CM

## 2024-06-20 DIAGNOSIS — M51.36 LUMBAR DEGENERATIVE DISC DISEASE: ICD-10-CM

## 2024-06-20 DIAGNOSIS — M54.17 LUMBOSACRAL RADICULITIS: Primary | ICD-10-CM

## 2024-06-20 DIAGNOSIS — M54.41 CHRONIC MIDLINE LOW BACK PAIN WITH BILATERAL SCIATICA: ICD-10-CM

## 2024-06-20 DIAGNOSIS — M47.816 LUMBAR SPONDYLOSIS: ICD-10-CM

## 2024-06-20 DIAGNOSIS — M47.812 CERVICAL SPONDYLOSIS: ICD-10-CM

## 2024-06-20 PROCEDURE — 99214 OFFICE O/P EST MOD 30 MIN: CPT | Performed by: NURSE PRACTITIONER

## 2024-06-20 PROCEDURE — G8417 CALC BMI ABV UP PARAM F/U: HCPCS | Performed by: NURSE PRACTITIONER

## 2024-06-20 PROCEDURE — 1036F TOBACCO NON-USER: CPT | Performed by: NURSE PRACTITIONER

## 2024-06-20 PROCEDURE — 1123F ACP DISCUSS/DSCN MKR DOCD: CPT | Performed by: NURSE PRACTITIONER

## 2024-06-20 PROCEDURE — 3017F COLORECTAL CA SCREEN DOC REV: CPT | Performed by: NURSE PRACTITIONER

## 2024-06-20 PROCEDURE — G8427 DOCREV CUR MEDS BY ELIG CLIN: HCPCS | Performed by: NURSE PRACTITIONER

## 2024-06-20 ASSESSMENT — ENCOUNTER SYMPTOMS
GASTROINTESTINAL NEGATIVE: 1
WHEEZING: 0
BACK PAIN: 1
APNEA: 0
CHEST TIGHTNESS: 0
CHOKING: 0
SHORTNESS OF BREATH: 0

## 2024-06-20 NOTE — PROGRESS NOTES
Functionality Assessment/Goals Worksheet     On a scale of 0 (Does not Interfere) to 10 (Completely Interferes)     1.  Which number describes how during the past week pain has interfered with           the following:  A.  General Activity:  6  B.  Mood: 3  C.  Walking Ability:  8  D.  Normal Work (Includes both work outside the home and housework):  8  E.  Relations with Other People:   4  F.  Sleep:   8  G.  Enjoyment of Life:   7    2.  Patient Prefers to Take their Pain Medications:     []  On a regular basis   [x]  Only when necessary    []  Does not take pain medications    3.  What are the Patient's Goals/Expectations for Visiting Pain Management?     []  Learn about my pain    [x]  Receive Medication   []  Physical Therapy     []  Treat Depression   [x]  Receive Injections    []  Treat Sleep   []  Deal with Anxiety and Stress   []  Treat Opoid Dependence/Addiction   []  Other:         HPI:   Gary Gomez is a 68 y.o. male is here today for    Chief Complaint: Low back pain, leg pain, generalized pain, right shoulder pain,     Patients wife present     HPI   2 month follow up. Continues to have multiple pain complaints generalized pain that moves around. Main com plaint is pain in low back in center and radiates out axially- aching pain \"its screening at me at time\". Has aching and stiffness all over- right shoulder, hands   States that he has gotten off of gluten which is helping his generalized pain   Denies any radicular pain at this time.   Continues OT for right shoulder has about 4 sessions left. He continues home exercises     Continues Norco prn which remains effective for moderate to severe pain, tylenol prn, . Has been taking mainly at bedtime lately which does help him relax and sleep. Continues Neurontin 100 mg TID from pcp and Baclofen. He feels that pain is tolerable with medications     Pain increases with bending, lifting, twisting , walking, standing, getting up and down, and housework or

## 2024-08-20 ENCOUNTER — OFFICE VISIT (OUTPATIENT)
Dept: PHYSICAL MEDICINE AND REHAB | Age: 69
End: 2024-08-20
Payer: MEDICARE

## 2024-08-20 VITALS
DIASTOLIC BLOOD PRESSURE: 80 MMHG | BODY MASS INDEX: 32.75 KG/M2 | HEIGHT: 71 IN | SYSTOLIC BLOOD PRESSURE: 120 MMHG | WEIGHT: 233.91 LBS

## 2024-08-20 DIAGNOSIS — M79.18 CHRONIC MYOFASCIAL PAIN: ICD-10-CM

## 2024-08-20 DIAGNOSIS — M54.2 NECK PAIN: ICD-10-CM

## 2024-08-20 DIAGNOSIS — M25.50 POLYARTHRALGIA: ICD-10-CM

## 2024-08-20 DIAGNOSIS — M47.816 LUMBAR SPONDYLOSIS: ICD-10-CM

## 2024-08-20 DIAGNOSIS — M54.42 CHRONIC MIDLINE LOW BACK PAIN WITH BILATERAL SCIATICA: ICD-10-CM

## 2024-08-20 DIAGNOSIS — G89.29 CHRONIC MYOFASCIAL PAIN: ICD-10-CM

## 2024-08-20 DIAGNOSIS — M51.36 LUMBAR DEGENERATIVE DISC DISEASE: ICD-10-CM

## 2024-08-20 DIAGNOSIS — M54.17 LUMBOSACRAL RADICULITIS: Primary | ICD-10-CM

## 2024-08-20 DIAGNOSIS — Z98.890 HISTORY OF LUMBAR LAMINECTOMY: ICD-10-CM

## 2024-08-20 DIAGNOSIS — G89.4 CHRONIC PAIN SYNDROME: ICD-10-CM

## 2024-08-20 DIAGNOSIS — M47.812 CERVICAL SPONDYLOSIS: ICD-10-CM

## 2024-08-20 DIAGNOSIS — G89.29 CHRONIC MIDLINE LOW BACK PAIN WITH BILATERAL SCIATICA: ICD-10-CM

## 2024-08-20 DIAGNOSIS — M48.061 SPINAL STENOSIS OF LUMBAR REGION WITHOUT NEUROGENIC CLAUDICATION: ICD-10-CM

## 2024-08-20 DIAGNOSIS — M54.41 CHRONIC MIDLINE LOW BACK PAIN WITH BILATERAL SCIATICA: ICD-10-CM

## 2024-08-20 PROCEDURE — 1036F TOBACCO NON-USER: CPT | Performed by: NURSE PRACTITIONER

## 2024-08-20 PROCEDURE — 3017F COLORECTAL CA SCREEN DOC REV: CPT | Performed by: NURSE PRACTITIONER

## 2024-08-20 PROCEDURE — 99214 OFFICE O/P EST MOD 30 MIN: CPT | Performed by: NURSE PRACTITIONER

## 2024-08-20 PROCEDURE — G8427 DOCREV CUR MEDS BY ELIG CLIN: HCPCS | Performed by: NURSE PRACTITIONER

## 2024-08-20 PROCEDURE — G8417 CALC BMI ABV UP PARAM F/U: HCPCS | Performed by: NURSE PRACTITIONER

## 2024-08-20 PROCEDURE — 1123F ACP DISCUSS/DSCN MKR DOCD: CPT | Performed by: NURSE PRACTITIONER

## 2024-08-20 ASSESSMENT — ENCOUNTER SYMPTOMS
CHEST TIGHTNESS: 0
APNEA: 0
CHOKING: 0
WHEEZING: 0
GASTROINTESTINAL NEGATIVE: 1
BACK PAIN: 1
SHORTNESS OF BREATH: 0

## 2024-08-20 NOTE — PROGRESS NOTES
[facility] [place] [facility]  [facility]  [address]  [place] [address]  Dept: [phone]  Dept Fax: [phone]  Loc: [phone]    Visit Date: 8/20/2024    Functionality Assessment/Goals Worksheet     On a scale of 0 (Does not Interfere) to 10 (Completely Interferes)     1.  Which number describes how during the past week pain has interfered with       the following:  A.  General Activity:  4  B.  Mood: 5  C.  Walking Ability:  5  D.  Normal Work (Includes both work outside the home and housework):  3  E.  Relations with Other People:   4  F.  Sleep:   9  G.  Enjoyment of Life:   5    2.  Patient Prefers to Take their Pain Medications:     []  On a regular basis   [x]  Only when necessary    []  Does not take pain medications    3.  What are the Patient's Goals/Expectations for Visiting Pain Management?     []  Learn about my pain    [x]  Receive Medication   []  Physical Therapy     []  Treat Depression   [x]  Receive Injections    []  Treat Sleep   []  Deal with Anxiety and Stress   []  Treat Opoid Dependence/Addiction   []  Other:        HPI:   Gary Gomez is a 69 y.o. male is here today for    Chief Complaint: Low back pain, leg pain, generalized pain     Patients wife is present     HPI   2 month follow up. Continues to have pain in joints and in low back and legs   low back in center and radiates out axially- aching pain, aching pain in joints   Intermittent swelling and numbness feeling in feet.   States pain is well controlled at this time.   Is following with Dr. Silvestre who is a family physician and is working on nutrition management and diet and cut out gluten. States pain is improved, acid reflux is reduced, overall feels inflammation is better.   States he went of Neurontin several weeks ago and feels mind is more clearer and memory \"I feel wonderful\"   Continues Norco prn which remains effective for moderate to

## 2024-09-09 DIAGNOSIS — G89.4 CHRONIC PAIN SYNDROME: ICD-10-CM

## 2024-09-09 DIAGNOSIS — M47.816 SPONDYLOSIS OF LUMBAR REGION WITHOUT MYELOPATHY OR RADICULOPATHY: ICD-10-CM

## 2024-09-09 DIAGNOSIS — M51.26 LUMBAR HERNIATED DISC: ICD-10-CM

## 2024-09-09 DIAGNOSIS — G89.29 CHRONIC BILATERAL LOW BACK PAIN WITHOUT SCIATICA: Primary | ICD-10-CM

## 2024-09-09 DIAGNOSIS — M54.50 CHRONIC BILATERAL LOW BACK PAIN WITHOUT SCIATICA: Primary | ICD-10-CM

## 2024-09-09 RX ORDER — HYDROCODONE BITARTRATE AND ACETAMINOPHEN 7.5; 325 MG/1; MG/1
1 TABLET ORAL EVERY 6 HOURS PRN
COMMUNITY
End: 2024-09-09 | Stop reason: SDUPTHER

## 2024-09-10 RX ORDER — HYDROCODONE BITARTRATE AND ACETAMINOPHEN 7.5; 325 MG/1; MG/1
1 TABLET ORAL 2 TIMES DAILY PRN
Qty: 60 TABLET | Refills: 0 | Status: SHIPPED | OUTPATIENT
Start: 2024-09-10 | End: 2024-10-10

## 2024-10-22 ENCOUNTER — OFFICE VISIT (OUTPATIENT)
Dept: PHYSICAL MEDICINE AND REHAB | Age: 69
End: 2024-10-22
Payer: MEDICARE

## 2024-10-22 VITALS
SYSTOLIC BLOOD PRESSURE: 122 MMHG | HEIGHT: 71 IN | DIASTOLIC BLOOD PRESSURE: 80 MMHG | WEIGHT: 233.91 LBS | BODY MASS INDEX: 32.75 KG/M2

## 2024-10-22 DIAGNOSIS — M51.360 DEGENERATION OF INTERVERTEBRAL DISC OF LUMBAR REGION WITH DISCOGENIC BACK PAIN: ICD-10-CM

## 2024-10-22 DIAGNOSIS — M54.42 CHRONIC MIDLINE LOW BACK PAIN WITH BILATERAL SCIATICA: ICD-10-CM

## 2024-10-22 DIAGNOSIS — M54.17 LUMBOSACRAL RADICULITIS: Primary | ICD-10-CM

## 2024-10-22 DIAGNOSIS — G89.29 CHRONIC MYOFASCIAL PAIN: ICD-10-CM

## 2024-10-22 DIAGNOSIS — M54.41 CHRONIC MIDLINE LOW BACK PAIN WITH BILATERAL SCIATICA: ICD-10-CM

## 2024-10-22 DIAGNOSIS — M47.812 CERVICAL SPONDYLOSIS: ICD-10-CM

## 2024-10-22 DIAGNOSIS — M48.061 SPINAL STENOSIS OF LUMBAR REGION WITHOUT NEUROGENIC CLAUDICATION: ICD-10-CM

## 2024-10-22 DIAGNOSIS — G89.4 CHRONIC PAIN SYNDROME: ICD-10-CM

## 2024-10-22 DIAGNOSIS — Z98.890 HISTORY OF LUMBAR LAMINECTOMY: ICD-10-CM

## 2024-10-22 DIAGNOSIS — M79.18 CHRONIC MYOFASCIAL PAIN: ICD-10-CM

## 2024-10-22 DIAGNOSIS — M47.816 LUMBAR SPONDYLOSIS: ICD-10-CM

## 2024-10-22 DIAGNOSIS — M25.50 POLYARTHRALGIA: ICD-10-CM

## 2024-10-22 DIAGNOSIS — G89.29 CHRONIC MIDLINE LOW BACK PAIN WITH BILATERAL SCIATICA: ICD-10-CM

## 2024-10-22 DIAGNOSIS — M54.2 NECK PAIN: ICD-10-CM

## 2024-10-22 PROCEDURE — G8417 CALC BMI ABV UP PARAM F/U: HCPCS | Performed by: NURSE PRACTITIONER

## 2024-10-22 PROCEDURE — 99214 OFFICE O/P EST MOD 30 MIN: CPT | Performed by: NURSE PRACTITIONER

## 2024-10-22 PROCEDURE — 1036F TOBACCO NON-USER: CPT | Performed by: NURSE PRACTITIONER

## 2024-10-22 PROCEDURE — G8484 FLU IMMUNIZE NO ADMIN: HCPCS | Performed by: NURSE PRACTITIONER

## 2024-10-22 PROCEDURE — G8427 DOCREV CUR MEDS BY ELIG CLIN: HCPCS | Performed by: NURSE PRACTITIONER

## 2024-10-22 PROCEDURE — 3017F COLORECTAL CA SCREEN DOC REV: CPT | Performed by: NURSE PRACTITIONER

## 2024-10-22 PROCEDURE — 1123F ACP DISCUSS/DSCN MKR DOCD: CPT | Performed by: NURSE PRACTITIONER

## 2024-10-22 ASSESSMENT — ENCOUNTER SYMPTOMS
APNEA: 0
BACK PAIN: 1
CHEST TIGHTNESS: 0
SHORTNESS OF BREATH: 0
CHOKING: 0
GASTROINTESTINAL NEGATIVE: 1
WHEEZING: 0

## 2024-10-22 NOTE — PROGRESS NOTES
[facility] [place] [facility]  [facility] NEUROSCIENCE AND REHABIL[facility]ATION CENTER  [address]  [place] [address]  Dept: [phone]  Dept Fax: [phone]  Loc: [phone]    Visit Date: 10/22/2024    Functionality Assessment/Goals Worksheet     On a scale of 0 (Does not Interfere) to 10 (Completely Interferes)     1.  Which number describes how during the past week pain has interfered with       the following:  A.  General Activity:  7  B.  Mood: 6  C.  Walking Ability:  8  D.  Normal Work (Includes both work outside the home and housework):  8  E.  Relations with Other People:   6  F.  Sleep:   8  G.  Enjoyment of Life:   7    2.  Patient Prefers to Take their Pain Medications:     []  On a regular basis   [x]  Only when necessary    []  Does not take pain medications    3.  What are the Patient's Goals/Expectations for Visiting Pain Management?     [x]  Learn about my pain    [x]  Receive Medication   [x]  Physical Therapy     []  Treat Depression   [x]  Receive Injections    []  Treat Sleep   []  Deal with Anxiety and Stress   []  Treat Opoid Dependence/Addiction   []  Other:        HPI:   Gary Gomez is a 69 y.o. male is here today for    Chief Complaint: Low back pain, leg pain, generalized pain     Patients wife is present     HPI   2 month follow up. Gary continues to have pain in his joints and low back and legs   Main compliant is pain is in his low back in center and radiates out axially- aching pain and sharp pain.    Has tightness in legs and numbness in his left foot.   Has ups and downs depending on activity as he states he has been active and has been exercising at [facility]. Feels that weather changes aggravates pain     Continues diet modification from his pcp and feels that diet changes are helping his pain big time    Continues Norco prn which remains effective for moderate to severe pain. Takes only prn mainly at bedtime, continues tylenol prn   Pain

## 2024-10-31 LAB
ANION GAP SERPL CALCULATED.3IONS-SCNC: 7 MMOL/L (ref 4–12)
BASOPHILS ABSOLUTE: 0 /CMM (ref 0–200)
BASOPHILS RELATIVE PERCENT: 0.3 % (ref 0–2)
BUN BLDV-MCNC: 13 MG/DL (ref 7–20)
CALCIUM SERPL-MCNC: 9.4 MG/DL (ref 8.8–10.5)
CHLORIDE BLD-SCNC: 103 MEQ/L (ref 101–111)
CHOLESTEROL, TOTAL: 125 MG/DL
CHOLESTEROL/HDL RELATIVE RISK: 2.1 (ref 4–5)
CO2: 27 MEQ/L (ref 21–32)
CREAT SERPL-MCNC: 0.94 MG/DL (ref 0.6–1.3)
CREATININE CLEARANCE: >60
DIRECT-LDL / HDL RISK: 0.8
DOUBLE STRANDED DNA AB, IGG: 2 IU/ML
ENA ANTIBODIES SCREEN: 0.1 RATIO
EOSINOPHILS ABSOLUTE: 100 /CMM (ref 0–500)
EOSINOPHILS RELATIVE PERCENT: 1.7 % (ref 0–6)
ESTIMATED AVERAGE GLUCOSE: 100 MG/DL
GLUCOSE: 95 MG/DL (ref 70–110)
HBA1C MFR BLD: 5.1 % (ref 4.4–6.4)
HCT VFR BLD CALC: 43.2 % (ref 40–49)
HDLC SERPL-MCNC: 58 MG/DL
HEMOGLOBIN: 14.5 GM/DL (ref 13.5–16.5)
LDL CHOLESTEROL DIRECT: 47 MG/DL
LYMPHOCYTES ABSOLUTE: 1300 /CMM (ref 1000–4800)
LYMPHOCYTES RELATIVE PERCENT: 28.4 % (ref 15–45)
MAGNESIUM: 2 MG/DL (ref 1.8–2.5)
MCH RBC QN AUTO: 33 PG (ref 27.5–33)
MCHC RBC AUTO-ENTMCNC: 33.6 GM/DL (ref 33–36)
MCV RBC AUTO: 98 CU MIC (ref 80–97)
MONOCYTES ABSOLUTE: 600 /CMM (ref 0–800)
MONOCYTES RELATIVE PERCENT: 12.4 % (ref 2–10)
NEUTROPHILS ABSOLUTE: 2700 /CMM (ref 1800–7700)
NEUTROPHILS RELATIVE PERCENT: 57.2 % (ref 40–70)
NUCLEATED RBCS: 0.2 /100 WBC
PDW BLD-RTO: 13.3 % (ref 12–16)
PLATELET # BLD: 184 TH/CMM (ref 150–400)
POTASSIUM SERPL-SCNC: 3.9 MEQ/L (ref 3.6–5)
PTH INTACT: 49.2 PG/ML (ref 12–88)
RBC # BLD: 4.41 MIL/CMM (ref 4.5–6)
RHEUMATOID FACTOR: NEGATIVE
SED RATE, AUTOMATED: 4 MM/HR
SODIUM BLD-SCNC: 137 MEQ/L (ref 135–145)
TRIGL SERPL-MCNC: 57 MG/DL
VITAMIN D 25-HYDROXY: 48.1 NG/ML (ref 30–100)
VLDLC SERPL CALC-MCNC: 11 MG/DL
WBC # BLD: 4.7 TH/CMM (ref 4.4–10.5)

## 2024-11-06 LAB
TESTOSTERONE FREE: 7.89 NG/DL (ref 3.28–12.2)
TESTOSTERONE TOTAL: 422 NG/DL (ref 240–950)

## 2024-11-07 LAB
C-REACTIVE PROTEIN: 0.5 MG/L
DEHYDROEPIANDROSTERONE: 1.1 NG/ML
DEHYDROEPIANDROSTERONE: 43 MCG/DL (ref 12–227)
HOMOCYSTEINE, TOTAL: 6.4 NMOL/ML (ref 7.3–16.3)
THYROID PEROXIDASE ANTIBODY: 3.2 IU/ML
TISSUE TRANSGLUTAMINASE ANTIBODY IGG: 1.7 U/ML
TISSUE TRANSGLUTAMINASE IGA: <1.2 U/ML

## 2024-12-16 DIAGNOSIS — G89.4 CHRONIC PAIN SYNDROME: ICD-10-CM

## 2024-12-16 DIAGNOSIS — G89.29 CHRONIC BILATERAL LOW BACK PAIN WITHOUT SCIATICA: ICD-10-CM

## 2024-12-16 DIAGNOSIS — M51.26 LUMBAR HERNIATED DISC: ICD-10-CM

## 2024-12-16 DIAGNOSIS — M54.50 CHRONIC BILATERAL LOW BACK PAIN WITHOUT SCIATICA: ICD-10-CM

## 2024-12-16 DIAGNOSIS — M47.816 SPONDYLOSIS OF LUMBAR REGION WITHOUT MYELOPATHY OR RADICULOPATHY: ICD-10-CM

## 2024-12-16 PROBLEM — J06.9 UPPER RESPIRATORY TRACT INFECTION: Status: ACTIVE | Noted: 2024-12-16

## 2024-12-16 NOTE — TELEPHONE ENCOUNTER
Gary Gomez called requesting a refill on the following medications:  Requested Prescriptions     Pending Prescriptions Disp Refills    HYDROcodone-acetaminophen (NORCO) 7.5-325 MG per tablet 60 tablet 0     Sig: Take 1 tablet by mouth 2 times daily as needed for Pain for up to 30 days. Max Daily Amount: 2 tablets     Pharmacy verified:[facility], [place], [place], ph. [phone]  .pv      Date of last visit: 10.22.2024  Date of next visit (if applicable): 12.31.2024

## 2024-12-16 NOTE — PROGRESS NOTES
released balanced B complex Item #[id number]  - Cinnamon bark 500 mg without Chromium or extract Item #355618  - Calcium carbonate 1000 mg, Magnesium oxide 500 mg, Vit D-3 400 IU Item #[id number]  - Magnesium oxide 500 mg tabs Item #000801 if less than 2 bowel movements daily  - ABC Seniors Item #017802 for most patients, One Daily Item #645784 with iron  - Vit D 3  1,000 Item #313717      2,000 IU Item #730223   Item #613357     Some brands containing orderived from soy oil or corn oil are OK if not allergic to soy    Nutrients for Special Needs by Mail order for less expense from www.puritan.com;  -Elemental Iron 65 mg tabs Item #236324 if need more iron for low iron on labs    Usually turns bowel movements grey, green or black but not a concern  - Time released Niacin 250 mg Item #038163 for cold intolerance, low libido or impotence  - DHEA 50 mg Item #132439 for improving DHEA levels on labs if having Fatigue    If stools too loose substitute for your Magnesium oxide using;   Magnesium citrate 200 mg tabs (NOT liquid) at [employer]   Magnesium gluconate 550 mg by John at puritan.com or amazon.com  Magnesium chloride foot soaks or body sprays  www.[employer]neumRx   Magnesium chloride flakes 14.99 Item #: [id number] if back-ordered, get spray  Magnesium threonate, Magtein also helps mental clarity and sleep    Food Drink Symptom Log;  I asked this patient to track these items and any other symptoms on their list on a weekly basis to documenttheir progress or lack of same. This can be done on the symptom tracking sheet I gave them at today's visit but looks like this:                                                      Rate on scale of 0-10 with zero = not noticeable  Symptom:                            Week 1               2                 3                 4               Etc            Hair loss    Foot cramps    Paresthesia    Aches    IBS (irritable bowel)    Constipation    Diarrhea  Nocturia (up to

## 2024-12-17 ENCOUNTER — TELEMEDICINE (OUTPATIENT)
Dept: FAMILY MEDICINE CLINIC | Age: 69
End: 2024-12-17

## 2024-12-17 DIAGNOSIS — R68.83 CHILLS: ICD-10-CM

## 2024-12-17 DIAGNOSIS — K21.00 GASTROESOPHAGEAL REFLUX DISEASE WITH ESOPHAGITIS WITHOUT HEMORRHAGE: ICD-10-CM

## 2024-12-17 DIAGNOSIS — K90.89 OTHER SPECIFIED INTESTINAL MALABSORPTION: ICD-10-CM

## 2024-12-17 DIAGNOSIS — M48.061 SPINAL STENOSIS OF LUMBAR REGION WITHOUT NEUROGENIC CLAUDICATION: ICD-10-CM

## 2024-12-17 DIAGNOSIS — I48.0 PAROXYSMAL ATRIAL FIBRILLATION (HCC): Primary | ICD-10-CM

## 2024-12-17 DIAGNOSIS — R20.0 NUMBNESS: ICD-10-CM

## 2024-12-17 DIAGNOSIS — R05.8 OTHER COUGH: ICD-10-CM

## 2024-12-17 RX ORDER — HYDROCODONE BITARTRATE AND ACETAMINOPHEN 7.5; 325 MG/1; MG/1
1 TABLET ORAL 2 TIMES DAILY PRN
Qty: 60 TABLET | Refills: 0 | Status: SHIPPED | OUTPATIENT
Start: 2024-12-17 | End: 2025-01-16

## 2024-12-17 ASSESSMENT — ENCOUNTER SYMPTOMS
COUGH: 1
SHORTNESS OF BREATH: 1
SINUS PAIN: 1
CHEST TIGHTNESS: 1
WHEEZING: 1
BACK PAIN: 1

## 2024-12-17 NOTE — TELEPHONE ENCOUNTER
OARRS reviewed. UDS: baclofen and hydrocodone are prn present. .   Last seen: 10/22/2024. Follow-up: 12/31/2024

## 2024-12-31 ENCOUNTER — OFFICE VISIT (OUTPATIENT)
Dept: PHYSICAL MEDICINE AND REHAB | Age: 69
End: 2024-12-31
Payer: MEDICARE

## 2024-12-31 VITALS
DIASTOLIC BLOOD PRESSURE: 72 MMHG | HEIGHT: 71 IN | SYSTOLIC BLOOD PRESSURE: 124 MMHG | BODY MASS INDEX: 32.75 KG/M2 | WEIGHT: 233.91 LBS

## 2024-12-31 DIAGNOSIS — M51.360 DEGENERATION OF INTERVERTEBRAL DISC OF LUMBAR REGION WITH DISCOGENIC BACK PAIN: ICD-10-CM

## 2024-12-31 DIAGNOSIS — Z98.890 HISTORY OF LUMBAR LAMINECTOMY: ICD-10-CM

## 2024-12-31 DIAGNOSIS — M54.41 CHRONIC MIDLINE LOW BACK PAIN WITH BILATERAL SCIATICA: ICD-10-CM

## 2024-12-31 DIAGNOSIS — G89.29 CHRONIC MIDLINE LOW BACK PAIN WITH BILATERAL SCIATICA: ICD-10-CM

## 2024-12-31 DIAGNOSIS — G89.29 CHRONIC MYOFASCIAL PAIN: ICD-10-CM

## 2024-12-31 DIAGNOSIS — G89.4 CHRONIC PAIN SYNDROME: ICD-10-CM

## 2024-12-31 DIAGNOSIS — M54.17 LUMBOSACRAL RADICULITIS: Primary | ICD-10-CM

## 2024-12-31 DIAGNOSIS — M25.50 POLYARTHRALGIA: ICD-10-CM

## 2024-12-31 DIAGNOSIS — M79.18 CHRONIC MYOFASCIAL PAIN: ICD-10-CM

## 2024-12-31 DIAGNOSIS — M47.816 LUMBAR SPONDYLOSIS: ICD-10-CM

## 2024-12-31 DIAGNOSIS — M54.42 CHRONIC MIDLINE LOW BACK PAIN WITH BILATERAL SCIATICA: ICD-10-CM

## 2024-12-31 DIAGNOSIS — M48.061 SPINAL STENOSIS OF LUMBAR REGION WITHOUT NEUROGENIC CLAUDICATION: ICD-10-CM

## 2024-12-31 PROCEDURE — 99214 OFFICE O/P EST MOD 30 MIN: CPT | Performed by: NURSE PRACTITIONER

## 2024-12-31 PROCEDURE — G8427 DOCREV CUR MEDS BY ELIG CLIN: HCPCS | Performed by: NURSE PRACTITIONER

## 2024-12-31 PROCEDURE — 1159F MED LIST DOCD IN RCRD: CPT | Performed by: NURSE PRACTITIONER

## 2024-12-31 PROCEDURE — 3017F COLORECTAL CA SCREEN DOC REV: CPT | Performed by: NURSE PRACTITIONER

## 2024-12-31 PROCEDURE — G8484 FLU IMMUNIZE NO ADMIN: HCPCS | Performed by: NURSE PRACTITIONER

## 2024-12-31 PROCEDURE — 1036F TOBACCO NON-USER: CPT | Performed by: NURSE PRACTITIONER

## 2024-12-31 PROCEDURE — 1123F ACP DISCUSS/DSCN MKR DOCD: CPT | Performed by: NURSE PRACTITIONER

## 2024-12-31 PROCEDURE — G8417 CALC BMI ABV UP PARAM F/U: HCPCS | Performed by: NURSE PRACTITIONER

## 2024-12-31 PROCEDURE — 1125F AMNT PAIN NOTED PAIN PRSNT: CPT | Performed by: NURSE PRACTITIONER

## 2024-12-31 ASSESSMENT — ENCOUNTER SYMPTOMS
SHORTNESS OF BREATH: 0
CHEST TIGHTNESS: 0
GASTROINTESTINAL NEGATIVE: 1
APNEA: 0
CHOKING: 0
COUGH: 1
BACK PAIN: 1
WHEEZING: 0

## 2024-12-31 NOTE — PROGRESS NOTES
past month and has been on 2 different antibiotics. States that he is feeling better. States that he has not been going to the [facility] because of this but is planning on going back to the [facility] next week.         Radiology:  Lumbar MRI:    FINDINGS:           There is 7 mm of anterolisthesis of L3 on L4. The other lumbar vertebral bodies are normally aligned.  There is disc desiccation throughout. There is abnormal marrow signal consistent with degenerative marrow changes throughout. There is associated   degenerative marrow edema in the L2, L3 and L4 vertebral bodies.  No focal suspicious osseous lesions are present.  There are no compression fractures.  There are chronic appearing pars defects of L3.     The visualized aspects of the distal spinal cord are normal. The nerve roots of the cauda equina and the tip of the conus are normal.     There is disc desiccation in the lower thoracic spine.     On the axial images, there are diffusely bulging discs and facet degenerative changes throughout. The patient has had posterior decompression at the L2-3 through L4-5 levels. There is no surgical hardware. The following degree of stenoses are noted:     At T12-L1, there is no spinal canal stenosis. There is mild bilateral foraminal stenosis.     At L1-L2, there is mild to moderate spinal canal stenosis. There is stenosis of both subarticular recesses. There is moderate severity left and mild right foraminal stenosis.     At L2-3, there is no stenosis of the thecal sac. There is moderate to severe right and mild left foraminal stenosis.     At L3-4, there is no stenosis of the thecal sac. There is severe bilateral foraminal stenosis which is worse on the right than the left.     At L4-5, there is no spinal canal stenosis. There is moderate to severe right and moderate severity left foraminal stenosis.     At L5-S1, there is no spinal canal stenosis. There is severe left foraminal stenosis.        There is no abnormal 
Private car

## 2025-02-27 DIAGNOSIS — M51.26 LUMBAR HERNIATED DISC: ICD-10-CM

## 2025-02-27 DIAGNOSIS — G89.29 CHRONIC BILATERAL LOW BACK PAIN WITHOUT SCIATICA: ICD-10-CM

## 2025-02-27 DIAGNOSIS — M54.50 CHRONIC BILATERAL LOW BACK PAIN WITHOUT SCIATICA: ICD-10-CM

## 2025-02-27 DIAGNOSIS — G89.4 CHRONIC PAIN SYNDROME: ICD-10-CM

## 2025-02-27 DIAGNOSIS — M47.816 SPONDYLOSIS OF LUMBAR REGION WITHOUT MYELOPATHY OR RADICULOPATHY: ICD-10-CM

## 2025-02-27 NOTE — TELEPHONE ENCOUNTER
Gary Gomez called requesting a refill on the following medications:  Requested Prescriptions     Pending Prescriptions Disp Refills    HYDROcodone-acetaminophen (NORCO) 7.5-325 MG per tablet 60 tablet 0     Sig: Take 1 tablet by mouth 2 times daily as needed for Pain for up to 30 days. Max Daily Amount: 2 tablets     Pharmacy verified:[facility]   [email or web address]      Date of last visit: 12/30/24  Date of next visit (if applicable): 3/4/2025

## 2025-02-28 RX ORDER — HYDROCODONE BITARTRATE AND ACETAMINOPHEN 7.5; 325 MG/1; MG/1
1 TABLET ORAL 2 TIMES DAILY PRN
Qty: 60 TABLET | Refills: 0 | Status: SHIPPED | OUTPATIENT
Start: 2025-02-28 | End: 2025-03-30

## 2025-02-28 NOTE — TELEPHONE ENCOUNTER
OARRS reviewed. UDS: negative for Norco.   Last seen: 12/31/2024. Follow-up:   Future Appointments   Date Time Provider Department Center   3/4/2025  1:20 PM Conor Jarrett, KRANTHI - CNP [facility] [place]

## 2025-03-04 ENCOUNTER — OFFICE VISIT (OUTPATIENT)
Dept: PHYSICAL MEDICINE AND REHAB | Age: 70
End: 2025-03-04
Payer: MEDICARE

## 2025-03-04 VITALS
WEIGHT: 233.91 LBS | SYSTOLIC BLOOD PRESSURE: 124 MMHG | DIASTOLIC BLOOD PRESSURE: 86 MMHG | BODY MASS INDEX: 32.75 KG/M2 | HEIGHT: 71 IN

## 2025-03-04 DIAGNOSIS — M54.17 LUMBOSACRAL RADICULITIS: Primary | ICD-10-CM

## 2025-03-04 DIAGNOSIS — G89.29 CHRONIC MYOFASCIAL PAIN: ICD-10-CM

## 2025-03-04 DIAGNOSIS — M25.50 POLYARTHRALGIA: ICD-10-CM

## 2025-03-04 DIAGNOSIS — G89.29 CHRONIC MIDLINE LOW BACK PAIN WITH BILATERAL SCIATICA: ICD-10-CM

## 2025-03-04 DIAGNOSIS — M48.061 SPINAL STENOSIS OF LUMBAR REGION WITHOUT NEUROGENIC CLAUDICATION: ICD-10-CM

## 2025-03-04 DIAGNOSIS — M79.18 CHRONIC MYOFASCIAL PAIN: ICD-10-CM

## 2025-03-04 DIAGNOSIS — M47.816 LUMBAR SPONDYLOSIS: ICD-10-CM

## 2025-03-04 DIAGNOSIS — M51.360 DEGENERATION OF INTERVERTEBRAL DISC OF LUMBAR REGION WITH DISCOGENIC BACK PAIN: ICD-10-CM

## 2025-03-04 DIAGNOSIS — G89.4 CHRONIC PAIN SYNDROME: ICD-10-CM

## 2025-03-04 DIAGNOSIS — M54.41 CHRONIC MIDLINE LOW BACK PAIN WITH BILATERAL SCIATICA: ICD-10-CM

## 2025-03-04 DIAGNOSIS — Z98.890 HISTORY OF LUMBAR LAMINECTOMY: ICD-10-CM

## 2025-03-04 DIAGNOSIS — M54.42 CHRONIC MIDLINE LOW BACK PAIN WITH BILATERAL SCIATICA: ICD-10-CM

## 2025-03-04 PROCEDURE — G8427 DOCREV CUR MEDS BY ELIG CLIN: HCPCS | Performed by: NURSE PRACTITIONER

## 2025-03-04 PROCEDURE — 3017F COLORECTAL CA SCREEN DOC REV: CPT | Performed by: NURSE PRACTITIONER

## 2025-03-04 PROCEDURE — 99214 OFFICE O/P EST MOD 30 MIN: CPT | Performed by: NURSE PRACTITIONER

## 2025-03-04 PROCEDURE — 1123F ACP DISCUSS/DSCN MKR DOCD: CPT | Performed by: NURSE PRACTITIONER

## 2025-03-04 PROCEDURE — 1036F TOBACCO NON-USER: CPT | Performed by: NURSE PRACTITIONER

## 2025-03-04 PROCEDURE — 1125F AMNT PAIN NOTED PAIN PRSNT: CPT | Performed by: NURSE PRACTITIONER

## 2025-03-04 PROCEDURE — G8417 CALC BMI ABV UP PARAM F/U: HCPCS | Performed by: NURSE PRACTITIONER

## 2025-03-04 PROCEDURE — 1159F MED LIST DOCD IN RCRD: CPT | Performed by: NURSE PRACTITIONER

## 2025-03-04 ASSESSMENT — ENCOUNTER SYMPTOMS
APNEA: 0
WHEEZING: 0
GASTROINTESTINAL NEGATIVE: 1
RESPIRATORY NEGATIVE: 1
CHOKING: 0
COUGH: 0
CHEST TIGHTNESS: 0
BACK PAIN: 1
SHORTNESS OF BREATH: 0

## 2025-03-04 NOTE — PROGRESS NOTES
Patient is still not interested  in injections at this time as he states he had multiple injections at previous pain clinic which did not help   Discussed left knee and hip xray d/t pain but he is not interested.   Continue diet modification and daily exercise which is really helping pain. Encouraged patient to continue to go to [facility].   Current pain medications continues to help. Continue Continue Norco 7.5/325 BID prn for moderate to severe pain- refill sent 2/28/2025 he takes prn, continue tylenol prn for mild pain   Continues Baclofen from pcp  Discussed could try Lyrica again if needed in future. He is not interested d/t side effects in past with Neurontin   Is compliant    Meds. Prescribed:   No orders of the defined types were placed in this encounter.      Return in about 2 months (around 5/4/2025), or if symptoms worsen or fail to improve, for follow up  for medications.               Electronically signed by KRANTHI Rodriguez CNP on3/4/2025 at 1:35 PM

## 2025-06-03 ENCOUNTER — OFFICE VISIT (OUTPATIENT)
Dept: PHYSICAL MEDICINE AND REHAB | Age: 70
End: 2025-06-03
Payer: MEDICARE

## 2025-06-03 VITALS
DIASTOLIC BLOOD PRESSURE: 68 MMHG | WEIGHT: 233.91 LBS | HEIGHT: 71 IN | BODY MASS INDEX: 32.75 KG/M2 | SYSTOLIC BLOOD PRESSURE: 124 MMHG

## 2025-06-03 DIAGNOSIS — M25.50 POLYARTHRALGIA: ICD-10-CM

## 2025-06-03 DIAGNOSIS — G89.4 CHRONIC PAIN SYNDROME: ICD-10-CM

## 2025-06-03 DIAGNOSIS — Z98.890 HISTORY OF LUMBAR LAMINECTOMY: ICD-10-CM

## 2025-06-03 DIAGNOSIS — M47.816 LUMBAR SPONDYLOSIS: ICD-10-CM

## 2025-06-03 DIAGNOSIS — M54.17 LUMBOSACRAL RADICULITIS: Primary | ICD-10-CM

## 2025-06-03 DIAGNOSIS — M79.18 CHRONIC MYOFASCIAL PAIN: ICD-10-CM

## 2025-06-03 DIAGNOSIS — G89.29 CHRONIC MIDLINE LOW BACK PAIN WITH BILATERAL SCIATICA: ICD-10-CM

## 2025-06-03 DIAGNOSIS — G89.29 CHRONIC MYOFASCIAL PAIN: ICD-10-CM

## 2025-06-03 DIAGNOSIS — M54.42 CHRONIC MIDLINE LOW BACK PAIN WITH BILATERAL SCIATICA: ICD-10-CM

## 2025-06-03 DIAGNOSIS — M54.41 CHRONIC MIDLINE LOW BACK PAIN WITH BILATERAL SCIATICA: ICD-10-CM

## 2025-06-03 DIAGNOSIS — M48.061 SPINAL STENOSIS OF LUMBAR REGION WITHOUT NEUROGENIC CLAUDICATION: ICD-10-CM

## 2025-06-03 DIAGNOSIS — M51.360 DEGENERATION OF INTERVERTEBRAL DISC OF LUMBAR REGION WITH DISCOGENIC BACK PAIN: ICD-10-CM

## 2025-06-03 PROCEDURE — 3017F COLORECTAL CA SCREEN DOC REV: CPT | Performed by: NURSE PRACTITIONER

## 2025-06-03 PROCEDURE — 1123F ACP DISCUSS/DSCN MKR DOCD: CPT | Performed by: NURSE PRACTITIONER

## 2025-06-03 PROCEDURE — 1159F MED LIST DOCD IN RCRD: CPT | Performed by: NURSE PRACTITIONER

## 2025-06-03 PROCEDURE — 99214 OFFICE O/P EST MOD 30 MIN: CPT | Performed by: NURSE PRACTITIONER

## 2025-06-03 PROCEDURE — G8417 CALC BMI ABV UP PARAM F/U: HCPCS | Performed by: NURSE PRACTITIONER

## 2025-06-03 PROCEDURE — G8427 DOCREV CUR MEDS BY ELIG CLIN: HCPCS | Performed by: NURSE PRACTITIONER

## 2025-06-03 PROCEDURE — 1036F TOBACCO NON-USER: CPT | Performed by: NURSE PRACTITIONER

## 2025-06-03 PROCEDURE — 1125F AMNT PAIN NOTED PAIN PRSNT: CPT | Performed by: NURSE PRACTITIONER

## 2025-06-03 RX ORDER — HYDROCODONE BITARTRATE AND ACETAMINOPHEN 7.5; 325 MG/1; MG/1
1 TABLET ORAL 2 TIMES DAILY PRN
Qty: 60 TABLET | Refills: 0 | Status: SHIPPED | OUTPATIENT
Start: 2025-06-03 | End: 2025-07-03

## 2025-06-03 ASSESSMENT — ENCOUNTER SYMPTOMS
APNEA: 0
CHEST TIGHTNESS: 0
SHORTNESS OF BREATH: 0
GASTROINTESTINAL NEGATIVE: 1
WHEEZING: 0
CHOKING: 0
RESPIRATORY NEGATIVE: 1
BACK PAIN: 1
COUGH: 0

## 2025-06-03 NOTE — PROGRESS NOTES
There are no suspicious findings in the visualized aspects of the retroperitoneum and paraspinal soft tissues.        IMPRESSION:     1. Significant bilateral foraminal stenosis throughout the lumbar spine. This is severe on the left at the L5-S1 level.  2. Spinal canal stenosis is mild to moderate at the L1-2 level.     Cervical xray:   IMPRESSION:  1. Prior anterior cervical fusion, C5-6. Marked disc space narrowing C5-6 and C6-7.  2. No fracture seen. Prevertebral soft tissues unremarkable.  3. Mild posterior spondylosis C5-6 and C6-7.     Lumbar xray:   IMPRESSION:  1. Slight thoracolumbar dextro scoliosis. Mild lumbar levoscoliosis. Congested muscle spasm.  2. Moderate disc space narrowing and degenerative disc disease upper 4 lumbar levels. Moderate spondylosis with a few bulky bridging osteophytes. No acute fracture.  3. Antegrade spondylolisthesis L3 upon L4, 11 mm. Moderate multilevel degenerative facet arthropathy appears to involve the lower 4 lumbar levels. Sacroiliac joints unremarkable.    Medications reviewed. Patient denies side effects with medications. Patient states he is taking medications as prescribed. Hedenies receiving pain medications from other sources. He denies any ER visits since last visit.    Pain scale with out pain medications or at its worst is 9/10.  Pain scale with pain medications or at its best is 6/10.  Last dose of Norco was today   Drug screen reviewed from 3/4/2025 and was appropriate  Pill count completed  today and WNL: Yes      The patienthas no known allergies.      Subjective:      Review of Systems   Constitutional:  Positive for fatigue. Negative for activity change, appetite change, chills, diaphoresis, fever and unexpected weight change.   HENT:  Negative for congestion.    Eyes:  Positive for visual disturbance.        Wears corrective glasses, history of glaucoma on eye drops    Respiratory: Negative.  Negative for apnea, cough, choking, chest tightness,

## 2025-08-04 ENCOUNTER — OFFICE VISIT (OUTPATIENT)
Dept: PHYSICAL MEDICINE AND REHAB | Age: 70
End: 2025-08-04
Payer: MEDICARE

## 2025-08-04 VITALS
SYSTOLIC BLOOD PRESSURE: 138 MMHG | WEIGHT: 233 LBS | DIASTOLIC BLOOD PRESSURE: 82 MMHG | BODY MASS INDEX: 32.62 KG/M2 | HEIGHT: 71 IN

## 2025-08-04 DIAGNOSIS — M51.360 DEGENERATION OF INTERVERTEBRAL DISC OF LUMBAR REGION WITH DISCOGENIC BACK PAIN: ICD-10-CM

## 2025-08-04 DIAGNOSIS — M54.17 LUMBOSACRAL RADICULITIS: Primary | ICD-10-CM

## 2025-08-04 DIAGNOSIS — G89.4 CHRONIC PAIN SYNDROME: ICD-10-CM

## 2025-08-04 DIAGNOSIS — G89.29 CHRONIC MIDLINE LOW BACK PAIN WITH BILATERAL SCIATICA: ICD-10-CM

## 2025-08-04 DIAGNOSIS — M54.42 CHRONIC MIDLINE LOW BACK PAIN WITH BILATERAL SCIATICA: ICD-10-CM

## 2025-08-04 DIAGNOSIS — G89.29 CHRONIC MYOFASCIAL PAIN: ICD-10-CM

## 2025-08-04 DIAGNOSIS — M54.41 CHRONIC MIDLINE LOW BACK PAIN WITH BILATERAL SCIATICA: ICD-10-CM

## 2025-08-04 DIAGNOSIS — Z98.890 HISTORY OF LUMBAR LAMINECTOMY: ICD-10-CM

## 2025-08-04 DIAGNOSIS — M25.50 POLYARTHRALGIA: ICD-10-CM

## 2025-08-04 DIAGNOSIS — M48.061 SPINAL STENOSIS OF LUMBAR REGION WITHOUT NEUROGENIC CLAUDICATION: ICD-10-CM

## 2025-08-04 DIAGNOSIS — M79.18 CHRONIC MYOFASCIAL PAIN: ICD-10-CM

## 2025-08-04 DIAGNOSIS — M47.816 LUMBAR SPONDYLOSIS: ICD-10-CM

## 2025-08-04 PROCEDURE — G8417 CALC BMI ABV UP PARAM F/U: HCPCS | Performed by: NURSE PRACTITIONER

## 2025-08-04 PROCEDURE — 1123F ACP DISCUSS/DSCN MKR DOCD: CPT | Performed by: NURSE PRACTITIONER

## 2025-08-04 PROCEDURE — G8427 DOCREV CUR MEDS BY ELIG CLIN: HCPCS | Performed by: NURSE PRACTITIONER

## 2025-08-04 PROCEDURE — 1159F MED LIST DOCD IN RCRD: CPT | Performed by: NURSE PRACTITIONER

## 2025-08-04 PROCEDURE — 99214 OFFICE O/P EST MOD 30 MIN: CPT | Performed by: NURSE PRACTITIONER

## 2025-08-04 PROCEDURE — 1125F AMNT PAIN NOTED PAIN PRSNT: CPT | Performed by: NURSE PRACTITIONER

## 2025-08-04 PROCEDURE — 3017F COLORECTAL CA SCREEN DOC REV: CPT | Performed by: NURSE PRACTITIONER

## 2025-08-04 PROCEDURE — 1036F TOBACCO NON-USER: CPT | Performed by: NURSE PRACTITIONER

## 2025-08-04 ASSESSMENT — ENCOUNTER SYMPTOMS
SHORTNESS OF BREATH: 0
CHEST TIGHTNESS: 0
CHOKING: 0
WHEEZING: 0
BACK PAIN: 1
GASTROINTESTINAL NEGATIVE: 1
COUGH: 0
RESPIRATORY NEGATIVE: 1
APNEA: 0

## 2025-08-13 DIAGNOSIS — G89.4 CHRONIC PAIN SYNDROME: ICD-10-CM

## 2025-08-13 RX ORDER — HYDROCODONE BITARTRATE AND ACETAMINOPHEN 7.5; 325 MG/1; MG/1
1 TABLET ORAL 2 TIMES DAILY PRN
Qty: 60 TABLET | Refills: 0 | Status: SHIPPED | OUTPATIENT
Start: 2025-08-13 | End: 2025-09-12